# Patient Record
Sex: MALE | Race: BLACK OR AFRICAN AMERICAN | NOT HISPANIC OR LATINO
[De-identification: names, ages, dates, MRNs, and addresses within clinical notes are randomized per-mention and may not be internally consistent; named-entity substitution may affect disease eponyms.]

---

## 2019-05-28 ENCOUNTER — APPOINTMENT (OUTPATIENT)
Dept: ORTHOPEDIC SURGERY | Facility: CLINIC | Age: 59
End: 2019-05-28
Payer: MEDICARE

## 2019-05-28 VITALS
DIASTOLIC BLOOD PRESSURE: 90 MMHG | BODY MASS INDEX: 25.9 KG/M2 | SYSTOLIC BLOOD PRESSURE: 131 MMHG | HEIGHT: 67 IN | HEART RATE: 71 BPM | WEIGHT: 165 LBS

## 2019-05-28 DIAGNOSIS — Z78.9 OTHER SPECIFIED HEALTH STATUS: ICD-10-CM

## 2019-05-28 PROCEDURE — 99204 OFFICE O/P NEW MOD 45 MIN: CPT

## 2019-06-14 ENCOUNTER — OTHER (OUTPATIENT)
Age: 59
End: 2019-06-14

## 2019-06-17 ENCOUNTER — APPOINTMENT (OUTPATIENT)
Dept: ULTRASOUND IMAGING | Facility: CLINIC | Age: 59
End: 2019-06-17
Payer: MEDICARE

## 2019-06-17 ENCOUNTER — OUTPATIENT (OUTPATIENT)
Dept: OUTPATIENT SERVICES | Facility: HOSPITAL | Age: 59
LOS: 1 days | End: 2019-06-17

## 2019-06-17 DIAGNOSIS — R22.31 LOCALIZED SWELLING, MASS AND LUMP, RIGHT UPPER LIMB: ICD-10-CM

## 2019-06-17 PROCEDURE — 76882 US LMTD JT/FCL EVL NVASC XTR: CPT | Mod: 26,59,RT

## 2019-06-17 PROCEDURE — 93971 EXTREMITY STUDY: CPT | Mod: 26,RT

## 2019-06-20 ENCOUNTER — APPOINTMENT (OUTPATIENT)
Dept: ORTHOPEDIC SURGERY | Facility: CLINIC | Age: 59
End: 2019-06-20
Payer: MEDICARE

## 2019-06-20 VITALS
DIASTOLIC BLOOD PRESSURE: 79 MMHG | WEIGHT: 165 LBS | HEIGHT: 67 IN | BODY MASS INDEX: 25.9 KG/M2 | SYSTOLIC BLOOD PRESSURE: 149 MMHG | HEART RATE: 68 BPM

## 2019-06-20 PROCEDURE — 99214 OFFICE O/P EST MOD 30 MIN: CPT

## 2019-06-23 NOTE — HISTORY OF PRESENT ILLNESS
[FreeTextEntry1] : 06/20/2019\par Mr. FRANCIS HANCOCK returns for follow up of right hand and wrist swelling.  He had ultrasound and duplex done.  His symptoms have not improved.  He has minimal pain and does not have any paresthesia.\par \par 05/28/2019\par Mr. FRANCIS HANCOCK is a pleasant 58 year old male, RHD, disabled from injuries in bilateral knees from a fall.  He presents to the office for evaluation of right hand swelling.  Denies pain\par \par Duration of Symptoms: a couple of months\par Prior Injury: denies\par Pain Quality: denies pain\par Radiation: denies\par Paresthesia: denies\par Night Symptoms: denies\par Relieving Factor: nothing\par Exacerbating Factor: heat\par Previous Treatment: NSAIDs from PCP did not help\par Diabetic: denies\par Thyroid Disease: denies\par Tobacco Use: denies\par Prescription Pain Meds: oxycodone as needed from PCP\par Drugs: marijuana sometimes.

## 2019-06-23 NOTE — DISCUSSION/SUMMARY
[FreeTextEntry1] : 58M with right hand swelling and common extensor tenosynovitis\par \par -We discussed in detail the clinical and imaging findings\par -We discussed the possible causes of patient's conditions\par -I referred him to OT to work on ROM, pain control, edema control, modalities, strengthening, home exercises.  Tendinitis protocol.  WBAT.  He can wear a wrist brace at night and as needed for soft tissue rest.\par -I prescribed transdermal antiinflammatory cream BID as needed for symptom relief\par -I prescribed a short course of meloxicam 7.5mg daily for two weeks.  We discussed the GI and renal side effects of NSAIDs.\par -I referred him to rheumatology to work up possible inflammatory cause of his swelling\par -I will see him as needed\par -Patient had the opportunity to ask questions and he was in agreement with the plan\par -Over 50% of the time spent with the patient was on counseling the patient on the above diagnosis, treatment plan and prognosis.

## 2019-06-23 NOTE — REVIEW OF SYSTEMS
[Joint Stiffness] : joint stiffness [Joint Swelling] : joint swelling [Negative] : Allergic/Immunologic

## 2019-06-23 NOTE — PHYSICAL EXAM
[de-identified] : GENERAL: Awake, alert, cooperative, answers questions appropriately. No acute distress.  Ambulates independently with a normal gait.\par SKIN: Warm, dry, intact. Color and turgor normal. \par LUNGS: Demonstrates unlabored breathing on room air with no accessory muscle use.\par EXTREMITIES: Warm and well-perfused. \par NEUROLOGICAL: Grossly intact.\par \par FOCUSED UPPER EXTREMITY EXAM:\par \par RUE:\par -skin intact without any erythema or ecchymosis; generalized swelling in the hand, fingers, and dorsal wrist; normal finger cascade without malrotation\par -soft tissue mass over dorsal wrist about 2 x 3 cm, mobile, nontender, no Tinel's, no pulsatility\par -no thenar atrophy; no intrinsics wasting; 5/5 strength thumb opposition; 5/5 strength intrinsics\par -no tenderness to palpation over the A1 pulleys\par -no tenderness to palpation along first dorsal wrist compartment\par -no tenderness to palpation at the base of the thumb\par -no tenderness to palpation at the anatomic snuffbox\par -no pain with passive thumb circumduction with negative grind test\par -no pain with resisted thumb extension with negative Finkelstein test\par -no tenderness to palpation in the SL interval; negative scaphoid shift with gentle passive motion\par -no tenderness at the fovea\par -almost able to make full fist, stiffness in all digits from the swelling, no scissoring\par -decreased wrist ROM from swelling; full forearm supination/pronation; no ECU subluxation; DRUJ stable and nontender\par -negative carpal tunnel compression test, negative Phalen's, negative Tinel's at the wrist\par -no ulnar nerve subluxation at elbow with gentle PROM; negative cubital tunnel compression test; negative Tinel's at elbow\par -negative Froment's sign; negative Wartenberg's sign; no clawing\par -sensation intact in radial, ulnar, and median nerve distributions\par -Brisk capillary refill, fingers warm well perfused\par  [de-identified] : No new XRs were taken during today's visit.\par \par Duplex from 6/17/19 did not reveal any DVT in the RUE.\par \par U/S from 6/17/19 showed mild to moderate tenosynovitis of common extensor tendons without mass or ganglion.\par \par Outside X-rays of right hand (3 views) from 4/19/19 showed no significant degenerative changes with no acute fracture or dislocation.

## 2019-06-24 ENCOUNTER — APPOINTMENT (OUTPATIENT)
Dept: ULTRASOUND IMAGING | Facility: CLINIC | Age: 59
End: 2019-06-24

## 2019-06-27 ENCOUNTER — APPOINTMENT (OUTPATIENT)
Dept: RHEUMATOLOGY | Facility: CLINIC | Age: 59
End: 2019-06-27
Payer: MEDICARE

## 2019-06-27 VITALS
OXYGEN SATURATION: 98 % | BODY MASS INDEX: 25.83 KG/M2 | TEMPERATURE: 97.7 F | SYSTOLIC BLOOD PRESSURE: 140 MMHG | HEART RATE: 61 BPM | WEIGHT: 155 LBS | HEIGHT: 65 IN | DIASTOLIC BLOOD PRESSURE: 70 MMHG

## 2019-06-27 DIAGNOSIS — Z82.69 FAMILY HISTORY OF OTHER DISEASES OF THE MUSCULOSKELETAL SYSTEM AND CONNECTIVE TISSUE: ICD-10-CM

## 2019-06-27 DIAGNOSIS — Z83.2 FAMILY HISTORY OF DISEASES OF THE BLOOD AND BLOOD-FORMING ORGANS AND CERTAIN DISORDERS INVOLVING THE IMMUNE MECHANISM: ICD-10-CM

## 2019-06-27 PROCEDURE — 99204 OFFICE O/P NEW MOD 45 MIN: CPT

## 2019-06-27 NOTE — PHYSICAL EXAM
[General Appearance - Alert] : alert [General Appearance - Well Nourished] : well nourished [Sclera] : the sclera and conjunctiva were normal [General Appearance - In No Acute Distress] : in no acute distress [PERRL With Normal Accommodation] : pupils were equal in size, round, and reactive to light [Extraocular Movements] : extraocular movements were intact [Outer Ear] : the ears and nose were normal in appearance [Nasal Cavity] : the nasal mucosa and septum were normal [Oropharynx] : the oropharynx was normal [Neck Appearance] : the appearance of the neck was normal [Neck Cervical Mass (___cm)] : no neck mass was observed [Thyroid Diffuse Enlargement] : the thyroid was not enlarged [Auscultation Breath Sounds / Voice Sounds] : lungs were clear to auscultation bilaterally [Heart Rate And Rhythm] : heart rate was normal and rhythm regular [Heart Sounds] : normal S1 and S2 [Heart Sounds Gallop] : no gallops [Murmurs] : no murmurs [Heart Sounds Pericardial Friction Rub] : no pericardial rub [Full Pulse] : the pedal pulses are present [Edema] : there was no peripheral edema [Bowel Sounds] : normal bowel sounds [Abdomen Tenderness] : non-tender [Abdomen Soft] : soft [Cervical Lymph Nodes Enlarged Posterior Bilaterally] : posterior cervical [Cervical Lymph Nodes Enlarged Anterior Bilaterally] : anterior cervical [Abdomen Mass (___ Cm)] : no abdominal mass palpated [Supraclavicular Lymph Nodes Enlarged Bilaterally] : supraclavicular [No CVA Tenderness] : no ~M costovertebral angle tenderness [No Spinal Tenderness] : no spinal tenderness [Abnormal Walk] : normal gait [Nail Clubbing] : no clubbing  or cyanosis of the fingernails [Motor Tone] : muscle strength and tone were normal [] : no rash [Skin Color & Pigmentation] : normal skin color and pigmentation [Deep Tendon Reflexes (DTR)] : deep tendon reflexes were 2+ and symmetric [Sensation] : the sensory exam was normal to light touch and pinprick [Cranial Nerves] : cranial nerves 2-12 were intact [Oriented To Time, Place, And Person] : oriented to person, place, and time [Impaired Insight] : insight and judgment were intact [Affect] : the affect was normal [FreeTextEntry1] : CN 2-12 intact, no facial asymmetry, see MSK exam

## 2019-06-27 NOTE — CONSULT LETTER
[Dear  ___] : Dear  [unfilled], [Consult Closing:] : Thank you very much for allowing me to participate in the care of this patient.  If you have any questions, please do not hesitate to contact me. [Please see my note below.] : Please see my note below. [( Thank you for referring [unfilled] for consultation for _____ )] : Thank you for referring [unfilled] for consultation for [unfilled] [Sincerely,] : Sincerely, [FreeTextEntry2] : Joyce Barger [FreeTextEntry3] : Peg King MD\par Rheumatology\par [facility] \par \par [address] [place] [address]\par P: [phone]\par F: [phone]\par   [DrHalima  ___] : Dr. CUTLER

## 2019-06-27 NOTE — REVIEW OF SYSTEMS
[Feeling Tired] : feeling tired [Negative] : Endocrine [Arthralgias] : arthralgias [Joint Pain] : joint pain [Joint Swelling] : joint swelling [As Noted in HPI] : as noted in HPI [Joint Stiffness] : joint stiffness [Limb Weakness] : limb weakness

## 2019-06-27 NOTE — ASSESSMENT
[FreeTextEntry1] : FRANCIS HANCOCK is a 58 year old man who presents with 4 month hx of slowly resolving R hand/wrist tenosynovitis seen on U/S and synovitis over PIPs/DIPs and ganglion cyst at base of hand. Over same time course with R foot swelling, now resolved, new weakness in RLE, paresthesias in b/l LE and FH of SLE/sarcoid. Differential of an inflammatory arthritis + neurological symptoms remains broad - SLE, Sjogrens, APLS, sarcoid, infectious/ viral infection, thyroid d/o, RA, PsA, hemochromatosis, Lyme.\par \par - check full serologies to cover all differentials above, check syphilis and B12/folate as well, cpk/ aldolase\par - neurology referral \par - CXR to eval for sarcoid \par - has not started mobic yet, advised to take once daily with food x 2 weeks, then prn pain\par - RTC in 2-4 weeks

## 2019-06-27 NOTE — HISTORY OF PRESENT ILLNESS
[FreeTextEntry1] : FRANCIS HANCOCK is a 58 year old man who presents with abrupt onset of pain and swelling in R wrist, R PIPs and DIPs 4 months ago, has persisted with slow resolution since. No preceding infections, trauma, breaks in skin. Limited ability to make a fist 2/2 swelling, now improved by 75% without meds, no further wrist pain. + ganglion cyst at base on hand shorting after pain started, nodule non-tender and mobile and has nearly resolved. R shoulder achiness over same time course, R foot edema at time on onset, given Lasix by PMD and swelling resolved, no recurrence. Over same time course has noted weakness in his R thigh, sensation that he "doesn't have control over my right foot" and heat sensation in LLE. S/p CT brain recently to eval for ?CVA. \par \par Tried celebrex by PMD when joint symptoms began, no relief and hasn't tried OTC NSAIDs. Given topical NSAIDs and Mobic by ortho but has not started as yet. 30 min of AM stiffness in R hand. L hand/wrist/shoulder unaffected. \par \par No hx of gout. On ROS + mild night sweats and fatigue over same time course, denies F/C, unintentional weight loss, infections, CP, SOB, cough, salivary gland swelling, abd pain, diarrhea, dysuria. No hx of STDs or tick bites. No rash, no EN, no psoriasis, + chronic low back pain but no worsening spine issues, no eye inflammation, or IBD. + coloscopy, normal. + FH cousins with SLE, sarcoid \par \par Imaging: \par - Duplex  6/17/19 did not reveal any DVT in the RUE.\par - U/S 6/17/19 + mild to moderate tenosynovitis of common extensor tendons without mass or ganglion.\par - X-rays of right hand 4/19/19 - no significant degenerative changes with no acute fracture or dislocation.\par

## 2019-07-16 ENCOUNTER — FORM ENCOUNTER (OUTPATIENT)
Age: 59
End: 2019-07-16

## 2019-07-16 ENCOUNTER — APPOINTMENT (OUTPATIENT)
Dept: NEUROLOGY | Facility: CLINIC | Age: 59
End: 2019-07-16
Payer: MEDICARE

## 2019-07-16 VITALS
BODY MASS INDEX: 25.11 KG/M2 | HEIGHT: 67 IN | SYSTOLIC BLOOD PRESSURE: 130 MMHG | WEIGHT: 160 LBS | DIASTOLIC BLOOD PRESSURE: 70 MMHG

## 2019-07-16 PROCEDURE — 99204 OFFICE O/P NEW MOD 45 MIN: CPT

## 2019-07-16 RX ORDER — CREAM BASE NO.31
CREAM (GRAM) MISCELLANEOUS
Qty: 1 | Refills: 0 | Status: DISCONTINUED | COMMUNITY
Start: 2019-06-20 | End: 2019-07-16

## 2019-07-16 RX ORDER — MELOXICAM 7.5 MG/1
7.5 TABLET ORAL DAILY
Qty: 14 | Refills: 0 | Status: DISCONTINUED | COMMUNITY
Start: 2019-06-20 | End: 2019-07-16

## 2019-07-17 ENCOUNTER — OUTPATIENT (OUTPATIENT)
Dept: OUTPATIENT SERVICES | Facility: HOSPITAL | Age: 59
LOS: 1 days | End: 2019-07-17

## 2019-07-17 ENCOUNTER — APPOINTMENT (OUTPATIENT)
Dept: MRI IMAGING | Facility: CLINIC | Age: 59
End: 2019-07-17
Payer: MEDICARE

## 2019-07-17 DIAGNOSIS — G81.90 HEMIPLEGIA, UNSPECIFIED AFFECTING UNSPECIFIED SIDE: ICD-10-CM

## 2019-07-17 PROCEDURE — 70553 MRI BRAIN STEM W/O & W/DYE: CPT | Mod: 26

## 2019-07-19 ENCOUNTER — OTHER (OUTPATIENT)
Age: 59
End: 2019-07-19

## 2019-07-26 ENCOUNTER — LABORATORY RESULT (OUTPATIENT)
Age: 59
End: 2019-07-26

## 2019-07-29 LAB
ALBUMIN SERPL ELPH-MCNC: 4.3 G/DL
ALP BLD-CCNC: 57 U/L
ALT SERPL-CCNC: 14 U/L
ANA SER IF-ACNC: NEGATIVE
ANION GAP SERPL CALC-SCNC: 12 MMOL/L
APPEARANCE: CLEAR
AST SERPL-CCNC: 18 U/L
BACTERIA: NEGATIVE
BASOPHILS # BLD AUTO: 0.11 K/UL
BASOPHILS NFR BLD AUTO: 1.8 %
BILIRUB SERPL-MCNC: 0.4 MG/DL
BILIRUBIN URINE: NEGATIVE
BLOOD URINE: NEGATIVE
BUN SERPL-MCNC: 20 MG/DL
C3 SERPL-MCNC: 128 MG/DL
C4 SERPL-MCNC: 37 MG/DL
CALCIUM SERPL-MCNC: 9.7 MG/DL
CCP AB SER IA-ACNC: <8 UNITS
CHLORIDE SERPL-SCNC: 102 MMOL/L
CK SERPL-CCNC: 97 U/L
CO2 SERPL-SCNC: 28 MMOL/L
COLOR: NORMAL
CREAT SERPL-MCNC: 1.41 MG/DL
CRP SERPL-MCNC: <0.1 MG/DL
DSDNA AB SER-ACNC: <12 IU/ML
ENA RNP AB SER IA-ACNC: <0.2 AL
ENA SM AB SER IA-ACNC: <0.2 AL
ENA SS-A AB SER IA-ACNC: <0.2 AL
ENA SS-B AB SER IA-ACNC: <0.2 AL
EOSINOPHIL # BLD AUTO: 0.29 K/UL
EOSINOPHIL NFR BLD AUTO: 4.7 %
ERYTHROCYTE [SEDIMENTATION RATE] IN BLOOD BY WESTERGREN METHOD: 17 MM/HR
FERRITIN SERPL-MCNC: 475 NG/ML
FOLATE SERPL-MCNC: 6.9 NG/ML
GLUCOSE QUALITATIVE U: NEGATIVE
GLUCOSE SERPL-MCNC: 102 MG/DL
HCT VFR BLD CALC: 40.9 %
HGB BLD-MCNC: 12.9 G/DL
HYALINE CASTS: 1 /LPF
IMM GRANULOCYTES NFR BLD AUTO: 0.5 %
IRON SATN MFR SERPL: 25 %
IRON SERPL-MCNC: 69 UG/DL
KETONES URINE: NEGATIVE
LEUKOCYTE ESTERASE URINE: NEGATIVE
LYMPHOCYTES # BLD AUTO: 1.51 K/UL
LYMPHOCYTES NFR BLD AUTO: 24.6 %
MAN DIFF?: NORMAL
MCHC RBC-ENTMCNC: 31.5 GM/DL
MCHC RBC-ENTMCNC: 31.7 PG
MCV RBC AUTO: 100.5 FL
MICROSCOPIC-UA: NORMAL
MONOCYTES # BLD AUTO: 0.43 K/UL
MONOCYTES NFR BLD AUTO: 7 %
MPO AB + PR3 PNL SER: NORMAL
NEUTROPHILS # BLD AUTO: 3.77 K/UL
NEUTROPHILS NFR BLD AUTO: 61.4 %
NITRITE URINE: NEGATIVE
PH URINE: 6
PLATELET # BLD AUTO: 239 K/UL
POTASSIUM SERPL-SCNC: 4.5 MMOL/L
PROT SERPL-MCNC: 6.6 G/DL
PROTEIN URINE: NEGATIVE
RBC # BLD: 4.07 M/UL
RBC # FLD: 11 %
RED BLOOD CELLS URINE: 2 /HPF
RF+CCP IGG SER-IMP: NEGATIVE
RHEUMATOID FACT SER QL: 16 IU/ML
SODIUM SERPL-SCNC: 142 MMOL/L
SPECIFIC GRAVITY URINE: 1.02
SQUAMOUS EPITHELIAL CELLS: 1 /HPF
THYROGLOB AB SERPL-ACNC: <20 IU/ML
THYROPEROXIDASE AB SERPL IA-ACNC: <10 IU/ML
TIBC SERPL-MCNC: 275 UG/DL
TSH SERPL-ACNC: 0.53 UIU/ML
UIBC SERPL-MCNC: 206 UG/DL
URATE SERPL-MCNC: 8.1 MG/DL
UROBILINOGEN URINE: NORMAL
VIT B12 SERPL-MCNC: 371 PG/ML
WBC # FLD AUTO: 6.14 K/UL
WHITE BLOOD CELLS URINE: 1 /HPF

## 2019-07-30 ENCOUNTER — OTHER (OUTPATIENT)
Age: 59
End: 2019-07-30

## 2019-07-30 LAB
ACE BLD-CCNC: 39 U/L
ALDOLASE SERPL-CCNC: 6.2 U/L
B2 GLYCOPROT1 IGA SERPL IA-ACNC: <5 SAU
B2 GLYCOPROT1 IGG SER-ACNC: <5 SGU
B2 GLYCOPROT1 IGM SER-ACNC: <5 SMU
CARDIOLIPIN IGM SER-MCNC: <5 MPL
CONFIRM: 25.4 SEC
DEPRECATED CARDIOLIPIN IGA SER: <5 APL
DRVVT IMM 1:2 NP PPP: NORMAL
DRVVT SCREEN TO CONFIRM RATIO: 0.99 RATIO
SCREEN DRVVT: 30.3 SEC
T PALLIDUM AB SER QL IA: NEGATIVE

## 2019-08-02 LAB
B19V IGG SER QL IA: 2.6 INDEX
B19V IGG+IGM SER-IMP: NORMAL
B19V IGG+IGM SER-IMP: POSITIVE
B19V IGM FLD-ACNC: 0.2 INDEX
B19V IGM SER-ACNC: NEGATIVE
CARDIOLIPIN IGM SER-MCNC: <5 GPL
HLA-B27 RELATED AG QL: NORMAL

## 2019-08-05 LAB
B BURGDOR AB SER-IMP: NEGATIVE
B BURGDOR IGM PATRN SER IB-IMP: NEGATIVE
B BURGDOR18KD IGG SER QL IB: NORMAL
B BURGDOR23KD IGG SER QL IB: NORMAL
B BURGDOR23KD IGM SER QL IB: NORMAL
B BURGDOR28KD IGG SER QL IB: NORMAL
B BURGDOR30KD IGG SER QL IB: NORMAL
B BURGDOR31KD IGG SER QL IB: NORMAL
B BURGDOR39KD IGG SER QL IB: NORMAL
B BURGDOR39KD IGM SER QL IB: NORMAL
B BURGDOR41KD IGG SER QL IB: PRESENT
B BURGDOR41KD IGM SER QL IB: PRESENT
B BURGDOR45KD IGG SER QL IB: NORMAL
B BURGDOR58KD IGG SER QL IB: NORMAL
B BURGDOR66KD IGG SER QL IB: NORMAL
B BURGDOR93KD IGG SER QL IB: NORMAL

## 2019-08-07 ENCOUNTER — APPOINTMENT (OUTPATIENT)
Dept: NEUROLOGY | Facility: CLINIC | Age: 59
End: 2019-08-07
Payer: MEDICARE

## 2019-08-07 PROCEDURE — 93880 EXTRACRANIAL BILAT STUDY: CPT

## 2019-08-07 PROCEDURE — 93890: CPT

## 2019-08-07 PROCEDURE — 93886 INTRACRANIAL COMPLETE STUDY: CPT

## 2019-08-08 NOTE — PROCEDURE
[FreeTextEntry3] : Carotid duplex  Dr. Moreau\par \par Date of study: 8/7/19\par \par Real-time color duplex ultrasound examination of the carotid system was performed including spectral waveform analysis and color-flow evaluation.\par \par Some plaque was seen near the bulbs bilaterally. Doppler flow characteristics showed no significant peak systolic or end-diastolic velocity elevations in either internal carotid artery to suggest any hemodynamically significant stenosis.\par \par Impression: Some plaque near the bulbs bilaterally. No ultrasound evidence for any hemodynamically significant stenosis in either internal carotid artery.\par \par Transcranial Doppler normal as well.\par

## 2019-08-13 ENCOUNTER — APPOINTMENT (OUTPATIENT)
Dept: RHEUMATOLOGY | Facility: CLINIC | Age: 59
End: 2019-08-13
Payer: MEDICARE

## 2019-08-13 VITALS
TEMPERATURE: 98.1 F | SYSTOLIC BLOOD PRESSURE: 126 MMHG | OXYGEN SATURATION: 100 % | BODY MASS INDEX: 24.96 KG/M2 | HEIGHT: 67 IN | HEART RATE: 60 BPM | WEIGHT: 159 LBS | DIASTOLIC BLOOD PRESSURE: 82 MMHG

## 2019-08-13 DIAGNOSIS — M77.9 ENTHESOPATHY, UNSPECIFIED: ICD-10-CM

## 2019-08-13 PROCEDURE — 99214 OFFICE O/P EST MOD 30 MIN: CPT

## 2019-08-13 NOTE — REVIEW OF SYSTEMS
[Feeling Tired] : feeling tired [Arthralgias] : arthralgias [Joint Pain] : joint pain [Joint Swelling] : joint swelling [Joint Stiffness] : joint stiffness [As Noted in HPI] : as noted in HPI [Limb Weakness] : limb weakness [Negative] : Heme/Lymph

## 2019-08-21 PROBLEM — M77.9 TENDINITIS OF EXTENSOR TENDON OF RIGHT HAND: Status: ACTIVE | Noted: 2019-06-20

## 2019-08-21 NOTE — PHYSICAL EXAM
[General Appearance - Alert] : alert [General Appearance - Well Nourished] : well nourished [General Appearance - In No Acute Distress] : in no acute distress [PERRL With Normal Accommodation] : pupils were equal in size, round, and reactive to light [Sclera] : the sclera and conjunctiva were normal [Extraocular Movements] : extraocular movements were intact [Outer Ear] : the ears and nose were normal in appearance [Nasal Cavity] : the nasal mucosa and septum were normal [Oropharynx] : the oropharynx was normal [Neck Appearance] : the appearance of the neck was normal [Auscultation Breath Sounds / Voice Sounds] : lungs were clear to auscultation bilaterally [Heart Sounds] : normal S1 and S2 [Heart Rate And Rhythm] : heart rate was normal and rhythm regular [Heart Sounds Gallop] : no gallops [Murmurs] : no murmurs [Heart Sounds Pericardial Friction Rub] : no pericardial rub [Full Pulse] : the pedal pulses are present [Bowel Sounds] : normal bowel sounds [Edema] : there was no peripheral edema [Abdomen Soft] : soft [Abdomen Tenderness] : non-tender [Cervical Lymph Nodes Enlarged Posterior Bilaterally] : posterior cervical [Abdomen Mass (___ Cm)] : no abdominal mass palpated [Cervical Lymph Nodes Enlarged Anterior Bilaterally] : anterior cervical [Supraclavicular Lymph Nodes Enlarged Bilaterally] : supraclavicular [No CVA Tenderness] : no ~M costovertebral angle tenderness [No Spinal Tenderness] : no spinal tenderness [Nail Clubbing] : no clubbing  or cyanosis of the fingernails [Abnormal Walk] : normal gait [Motor Tone] : muscle strength and tone were normal [Skin Color & Pigmentation] : normal skin color and pigmentation [Cranial Nerves] : cranial nerves 2-12 were intact [] : no rash [Deep Tendon Reflexes (DTR)] : deep tendon reflexes were 2+ and symmetric [Sensation] : the sensory exam was normal to light touch and pinprick [Oriented To Time, Place, And Person] : oriented to person, place, and time [Impaired Insight] : insight and judgment were intact [Affect] : the affect was normal [FreeTextEntry1] : CN 2-12 intact, no facial asymmetry, see MSK exam

## 2019-08-21 NOTE — ASSESSMENT
[FreeTextEntry1] : FRANCIS HANCOCK is a 58 year old man being seen for 4 month hx of persisting R hand/wrist tenosynovitis seen on U/S and synovitis over PIPs/DIPs and ganglion cyst at base of hand. Over same time course with R foot swelling, now resolved, new weakness in RLE, paresthesias in b/l LE and FH of SLE/sarcoid. Workup to date with low titer + RF and elevated ferritin, otherwise negative for SLE, thyroid, CPK, ANCA, sarcoid, infectious etiology. Sarcoid remains in differential even without characteristic findings. \par \par - start prednisone for persistent R hand synovitis in setting of + RF and no response to NSAIDs. If neuropathy/weakness AI related, may also show some improvement. Risks and benefits of steroids were d/w patient including but not limited to weight gain, diabetes, HTN, avascular necrosis, osteoporosis, glaucoma, cataract, infections and immunosuppression.\par  - obtain MRI R hand to eval for ongoing synovitis since last imaging as this may help elucidate underlying etiology\par - neurology f/u for LP given MRI findings -- discussed that this is an important test to have done, pt has not scheduled yet as he was concerned for the procedure/SE. \par - RTC following LP and repeat neuro eval

## 2019-08-21 NOTE — HISTORY OF PRESENT ILLNESS
[FreeTextEntry1] : FRANCIS HANCOCK is a 58 year old man who presents with abrupt onset of pain and swelling in R wrist, R PIPs and DIPs 4 months ago, has persisted with slow resolution since. No preceding infections, trauma, breaks in skin. Limited ability to make a fist 2/2 swelling, now improved by 75% without meds, no further wrist pain. + ganglion cyst at base on hand shorting after pain started, nodule non-tender and mobile and has nearly resolved. R shoulder achiness over same time course, R foot edema at time on onset, given Lasix by PMD and swelling resolved, no recurrence. Over same time course has noted weakness in his R thigh, sensation that he "doesn't have control over my right foot" and heat sensation in LLE. S/p CT brain recently to eval for ?CVA. \par \par Tried celebrex by PMD when joint symptoms began, no relief and hasn't tried OTC NSAIDs. Given topical NSAIDs and Mobic by ortho but has not started as yet. 30 min of AM stiffness in R hand. L hand/wrist/shoulder unaffected. \par \par No hx of gout. On ROS + mild night sweats and fatigue over same time course, denies F/C, unintentional weight loss, infections, CP, SOB, cough, salivary gland swelling, abd pain, diarrhea, dysuria. No hx of STDs or tick bites. No rash, no EN, no psoriasis, + chronic low back pain but no worsening spine issues, no eye inflammation, or IBD. + coloscopy, normal. + FH cousins with SLE, sarcoid \par \par Imaging: \par - Duplex  6/17/19 did not reveal any DVT in the RUE.\par - U/S 6/17/19 + mild to moderate tenosynovitis of common extensor tendons without mass or ganglion.\par - X-rays of right hand 4/19/19 - no significant degenerative changes with no acute fracture or dislocation.\par \par Still with pain and stiffness and limited ROM in R hand, R hand paresthesias as well. No recurrence of RLE edema but continued paresthesias, possibly a little worse. No other joint synovitis, AM stiffness. No rash. Tried mobic for a few days, stopped as no improvement. \par

## 2019-08-30 ENCOUNTER — CLINICAL ADVICE (OUTPATIENT)
Age: 59
End: 2019-08-30

## 2019-09-16 NOTE — ASU PATIENT PROFILE, ADULT - LEARNING ASSESSMENT (PATIENT) ADDITIONAL COMMENTS
Telephone interview with pt, instructed pt on pre-op instructions/teaching, tips for safer surgery, pain management scale, understanding of all instructions verbalized.

## 2019-09-18 ENCOUNTER — FORM ENCOUNTER (OUTPATIENT)
Age: 59
End: 2019-09-18

## 2019-09-18 RX ORDER — SODIUM CHLORIDE 9 MG/ML
3 INJECTION INTRAMUSCULAR; INTRAVENOUS; SUBCUTANEOUS ONCE
Refills: 0 | Status: DISCONTINUED | OUTPATIENT
Start: 2019-09-19 | End: 2019-10-04

## 2019-09-19 ENCOUNTER — OUTPATIENT (OUTPATIENT)
Dept: INPATIENT UNIT | Facility: HOSPITAL | Age: 59
LOS: 1 days | End: 2019-09-19
Payer: MEDICARE

## 2019-09-19 VITALS
DIASTOLIC BLOOD PRESSURE: 89 MMHG | OXYGEN SATURATION: 100 % | RESPIRATION RATE: 19 BRPM | HEART RATE: 68 BPM | SYSTOLIC BLOOD PRESSURE: 153 MMHG

## 2019-09-19 VITALS
WEIGHT: 164.91 LBS | SYSTOLIC BLOOD PRESSURE: 130 MMHG | TEMPERATURE: 98 F | RESPIRATION RATE: 16 BRPM | OXYGEN SATURATION: 99 % | HEIGHT: 67 IN | HEART RATE: 70 BPM | DIASTOLIC BLOOD PRESSURE: 75 MMHG

## 2019-09-19 DIAGNOSIS — G81.90 HEMIPLEGIA, UNSPECIFIED AFFECTING UNSPECIFIED SIDE: ICD-10-CM

## 2019-09-19 LAB
APPEARANCE CSF: CLEAR — SIGNIFICANT CHANGE UP
BASOPHILS # BLD AUTO: 0.11 K/UL — SIGNIFICANT CHANGE UP (ref 0–0.2)
BASOPHILS NFR BLD AUTO: 1.1 % — SIGNIFICANT CHANGE UP (ref 0–2)
COLOR CSF: COLORLESS — SIGNIFICANT CHANGE UP
CRYPTOC AG CSF-ACNC: NEGATIVE — SIGNIFICANT CHANGE UP
CSF PCR RESULT: SIGNIFICANT CHANGE UP
EOSINOPHIL # BLD AUTO: 0.53 K/UL — HIGH (ref 0–0.5)
EOSINOPHIL NFR BLD AUTO: 5.1 % — SIGNIFICANT CHANGE UP (ref 0–6)
GLUCOSE CSF-MCNC: 57 MG/DLG/24H — SIGNIFICANT CHANGE UP (ref 40–70)
GRAM STN FLD: SIGNIFICANT CHANGE UP
HCT VFR BLD CALC: 42.2 % — SIGNIFICANT CHANGE UP (ref 39–50)
HGB BLD-MCNC: 13.2 G/DL — SIGNIFICANT CHANGE UP (ref 13–17)
IMM GRANULOCYTES NFR BLD AUTO: 0.6 % — SIGNIFICANT CHANGE UP (ref 0–1.5)
LYMPHOCYTES # BLD AUTO: 2.68 K/UL — SIGNIFICANT CHANGE UP (ref 1–3.3)
LYMPHOCYTES # BLD AUTO: 25.9 % — SIGNIFICANT CHANGE UP (ref 13–44)
MCHC RBC-ENTMCNC: 31.3 GM/DL — LOW (ref 32–36)
MCHC RBC-ENTMCNC: 31.7 PG — SIGNIFICANT CHANGE UP (ref 27–34)
MCV RBC AUTO: 101.2 FL — HIGH (ref 80–100)
MONOCYTES # BLD AUTO: 0.9 K/UL — SIGNIFICANT CHANGE UP (ref 0–0.9)
MONOCYTES NFR BLD AUTO: 8.7 % — SIGNIFICANT CHANGE UP (ref 2–14)
NEUTROPHILS # BLD AUTO: 6.08 K/UL — SIGNIFICANT CHANGE UP (ref 1.8–7.4)
NEUTROPHILS # CSF: 0 % — SIGNIFICANT CHANGE UP
NEUTROPHILS NFR BLD AUTO: 58.6 % — SIGNIFICANT CHANGE UP (ref 43–77)
NRBC NFR CSF: 2 /UL — SIGNIFICANT CHANGE UP (ref 0–5)
PLATELET # BLD AUTO: 254 K/UL — SIGNIFICANT CHANGE UP (ref 150–400)
PROT CSF-MCNC: 45 MG/DL — SIGNIFICANT CHANGE UP (ref 15–45)
RBC # BLD: 4.17 M/UL — LOW (ref 4.2–5.8)
RBC # CSF: 1 /CMM — SIGNIFICANT CHANGE UP (ref 0–1)
RBC # FLD: 11.8 % — SIGNIFICANT CHANGE UP (ref 10.3–14.5)
SPECIMEN SOURCE: SIGNIFICANT CHANGE UP
TUBE TYPE: SIGNIFICANT CHANGE UP
WBC # BLD: 10.36 K/UL — SIGNIFICANT CHANGE UP (ref 3.8–10.5)
WBC # FLD AUTO: 10.36 K/UL — SIGNIFICANT CHANGE UP (ref 3.8–10.5)

## 2019-09-19 PROCEDURE — 87476 LYME DIS DNA AMP PROBE: CPT

## 2019-09-19 PROCEDURE — 87205 SMEAR GRAM STAIN: CPT

## 2019-09-19 PROCEDURE — 86255 FLUORESCENT ANTIBODY SCREEN: CPT

## 2019-09-19 PROCEDURE — 62272 THER SPI PNXR DRG CSF: CPT

## 2019-09-19 PROCEDURE — 77003 FLUOROGUIDE FOR SPINE INJECT: CPT

## 2019-09-19 PROCEDURE — 83916 OLIGOCLONAL BANDS: CPT

## 2019-09-19 PROCEDURE — 84157 ASSAY OF PROTEIN OTHER: CPT

## 2019-09-19 PROCEDURE — 87070 CULTURE OTHR SPECIMN AEROBIC: CPT

## 2019-09-19 PROCEDURE — 83873 ASSAY OF CSF PROTEIN: CPT

## 2019-09-19 PROCEDURE — 82784 ASSAY IGA/IGD/IGG/IGM EACH: CPT

## 2019-09-19 PROCEDURE — 86403 PARTICLE AGGLUT ANTBDY SCRN: CPT

## 2019-09-19 PROCEDURE — 36415 COLL VENOUS BLD VENIPUNCTURE: CPT

## 2019-09-19 PROCEDURE — 77003 FLUOROGUIDE FOR SPINE INJECT: CPT | Mod: 26

## 2019-09-19 PROCEDURE — 85027 COMPLETE CBC AUTOMATED: CPT

## 2019-09-19 PROCEDURE — 62270 DX LMBR SPI PNXR: CPT

## 2019-09-19 PROCEDURE — 87483 CNS DNA AMP PROBE TYPE 12-25: CPT

## 2019-09-19 PROCEDURE — 83615 LACTATE (LD) (LDH) ENZYME: CPT

## 2019-09-19 PROCEDURE — 82945 GLUCOSE OTHER FLUID: CPT

## 2019-09-19 PROCEDURE — 86592 SYPHILIS TEST NON-TREP QUAL: CPT

## 2019-09-19 PROCEDURE — 89051 BODY FLUID CELL COUNT: CPT

## 2019-09-19 RX ORDER — TAMSULOSIN HYDROCHLORIDE 0.4 MG/1
1 CAPSULE ORAL
Qty: 0 | Refills: 0 | DISCHARGE

## 2019-09-19 RX ORDER — OXYCODONE AND ACETAMINOPHEN 5; 325 MG/1; MG/1
1 TABLET ORAL ONCE
Refills: 0 | Status: DISCONTINUED | OUTPATIENT
Start: 2019-09-19 | End: 2019-09-19

## 2019-09-19 NOTE — ASU DISCHARGE PLAN (ADULT/PEDIATRIC) - CALL YOUR DOCTOR IF YOU HAVE ANY OF THE FOLLOWING:
Bleeding that does not stop/Pain not relieved by Medications/Nausea and vomiting that does not stop/Swelling that gets worse/Fever greater than (need to indicate Fahrenheit or Celsius)/worsening headache

## 2019-09-19 NOTE — BRIEF OPERATIVE NOTE - NSICDXBRIEFPROCEDURE_GEN_ALL_CORE_FT
PROCEDURES:  Lumbar puncture, with fluoroscopic guidance, for CSF drainage 19-Sep-2019 10:58:16  Piper Steel

## 2019-09-20 LAB
IGG CSF-MCNC: 4.8 MG/DL — HIGH
LDH SERPL L TO P-CCNC: 23 U/L — SIGNIFICANT CHANGE UP

## 2019-09-23 LAB
CULTURE RESULTS: SIGNIFICANT CHANGE UP
SPECIMEN SOURCE: SIGNIFICANT CHANGE UP

## 2019-09-24 ENCOUNTER — APPOINTMENT (OUTPATIENT)
Dept: RHEUMATOLOGY | Facility: CLINIC | Age: 59
End: 2019-09-24

## 2019-09-25 LAB
AQP4 H2O CHANNEL AB SERPL IA-ACNC: NEGATIVE — SIGNIFICANT CHANGE UP
B BURGDOR DNA SPEC QL NAA+PROBE: NEGATIVE — SIGNIFICANT CHANGE UP
MBP CSF-MCNC: <2 MCG/L — LOW (ref 2–4)
OLIGOCLONAL BANDS CSF ELPH-IMP: SIGNIFICANT CHANGE UP
VDRL CSF-TITR: NEGATIVE — SIGNIFICANT CHANGE UP

## 2019-10-02 ENCOUNTER — APPOINTMENT (OUTPATIENT)
Dept: NEUROLOGY | Facility: CLINIC | Age: 59
End: 2019-10-02
Payer: MEDICARE

## 2019-10-02 VITALS
DIASTOLIC BLOOD PRESSURE: 80 MMHG | SYSTOLIC BLOOD PRESSURE: 130 MMHG | HEIGHT: 67 IN | BODY MASS INDEX: 24.96 KG/M2 | WEIGHT: 159 LBS

## 2019-10-02 PROCEDURE — 99214 OFFICE O/P EST MOD 30 MIN: CPT

## 2019-10-10 ENCOUNTER — APPOINTMENT (OUTPATIENT)
Dept: NEUROLOGY | Facility: CLINIC | Age: 59
End: 2019-10-10
Payer: MEDICARE

## 2019-10-10 PROCEDURE — 95910 NRV CNDJ TEST 7-8 STUDIES: CPT

## 2019-10-10 PROCEDURE — 95937 NEUROMUSCULAR JUNCTION TEST: CPT

## 2019-10-10 PROCEDURE — 95886 MUSC TEST DONE W/N TEST COMP: CPT

## 2019-10-15 ENCOUNTER — APPOINTMENT (OUTPATIENT)
Dept: RHEUMATOLOGY | Facility: CLINIC | Age: 59
End: 2019-10-15
Payer: MEDICARE

## 2019-10-15 DIAGNOSIS — M25.831 OTHER SPECIFIED JOINT DISORDERS, RIGHT WRIST: ICD-10-CM

## 2019-10-15 PROCEDURE — 99214 OFFICE O/P EST MOD 30 MIN: CPT

## 2019-10-15 RX ORDER — PREDNISONE 5 MG/1
5 TABLET ORAL
Qty: 90 | Refills: 0 | Status: DISCONTINUED | COMMUNITY
Start: 2019-08-13 | End: 2019-10-15

## 2019-10-15 NOTE — REVIEW OF SYSTEMS
[Feeling Tired] : feeling tired [Joint Pain] : joint pain [Arthralgias] : arthralgias [Joint Stiffness] : joint stiffness [Joint Swelling] : joint swelling [As Noted in HPI] : as noted in HPI [Limb Weakness] : limb weakness [Negative] : Heme/Lymph

## 2019-10-18 VITALS — DIASTOLIC BLOOD PRESSURE: 78 MMHG | SYSTOLIC BLOOD PRESSURE: 124 MMHG | HEART RATE: 68 BPM | RESPIRATION RATE: 14 BRPM

## 2019-10-18 NOTE — PHYSICAL EXAM
[General Appearance - Alert] : alert [General Appearance - In No Acute Distress] : in no acute distress [General Appearance - Well Nourished] : well nourished [Sclera] : the sclera and conjunctiva were normal [Extraocular Movements] : extraocular movements were intact [PERRL With Normal Accommodation] : pupils were equal in size, round, and reactive to light [Nasal Cavity] : the nasal mucosa and septum were normal [Outer Ear] : the ears and nose were normal in appearance [Oropharynx] : the oropharynx was normal [Neck Appearance] : the appearance of the neck was normal [Auscultation Breath Sounds / Voice Sounds] : lungs were clear to auscultation bilaterally [Heart Rate And Rhythm] : heart rate was normal and rhythm regular [Heart Sounds] : normal S1 and S2 [Murmurs] : no murmurs [Heart Sounds Gallop] : no gallops [Heart Sounds Pericardial Friction Rub] : no pericardial rub [Full Pulse] : the pedal pulses are present [Edema] : there was no peripheral edema [Bowel Sounds] : normal bowel sounds [Abdomen Soft] : soft [Abdomen Tenderness] : non-tender [Cervical Lymph Nodes Enlarged Posterior Bilaterally] : posterior cervical [Abdomen Mass (___ Cm)] : no abdominal mass palpated [Supraclavicular Lymph Nodes Enlarged Bilaterally] : supraclavicular [Cervical Lymph Nodes Enlarged Anterior Bilaterally] : anterior cervical [No CVA Tenderness] : no ~M costovertebral angle tenderness [No Spinal Tenderness] : no spinal tenderness [Nail Clubbing] : no clubbing  or cyanosis of the fingernails [Motor Tone] : muscle strength and tone were normal [Skin Color & Pigmentation] : normal skin color and pigmentation [] : no rash [Cranial Nerves] : cranial nerves 2-12 were intact [Oriented To Time, Place, And Person] : oriented to person, place, and time [Impaired Insight] : insight and judgment were intact [Affect] : the affect was normal [FreeTextEntry1] : CN 2-12 intact, no facial asymmetry, see MSK exam

## 2019-10-18 NOTE — ASSESSMENT
[FreeTextEntry1] : FRANCIS HANCOCK is a 58 year old man being seen for multiple issues including self-limited synovitis of R hand without response to anti-inflammatories, persistent RLE proximal weakness, and persistent LLE paresthesias now with extension to L torso. Workup to date with low titer + RF and elevated ferritin, otherwise negative for SLE, thyroid, CPK, ANCA, sarcoid, infectious etiology. Sarcoid remains in differential but should have had partial response to steroids. ?IBM but this explains the weakness but not the synovitis or paresthesias, ?ALS but again explains the motor symptoms but not the joint inflammation or sensory issues. Difficult to determine unifying dx at present. \par \par - will d/w neurologist to determine next steps -- I am reluctant to get invasive testing such as a muscle bx when it may only explain one aspect of his symptoms. I discussed this at length with the patient and he is in agreement. \par  - ?MRI R thigh as next possible step and/or further EMG testing by neuro\par - will call to d/w patient once I have discussed with neurology

## 2019-10-18 NOTE — HISTORY OF PRESENT ILLNESS
[FreeTextEntry1] : Since last visit reports no further joint swelling, but continues to have slight puffiness of R hand and says it feels "tight." Neurologic complaints remain -- R thigh weakness with limitation in ADLs, worsening LLE paresthesias and sensitivity to heat which is exacerbated by neck movements. Also reports similar paresthesias on L side of torso only with slow progression to L arm. No UE weakness, no weakness in LLE. No rashes, no night sweats, F/C, worsening fatigue, unintentional weight loss. SLE ROS remains negative. Neuro w/u for MS negative, MRI spine with some C/L DJD, EMG reportedly normal. \par \par Off steroids x 1 week, hasn't helped at all. Doesn't feel different off them. Not placed on any meds by neuro, saw neurosurgeon --no intervention.

## 2019-10-30 ENCOUNTER — CLINICAL ADVICE (OUTPATIENT)
Age: 59
End: 2019-10-30

## 2019-12-20 ENCOUNTER — APPOINTMENT (OUTPATIENT)
Dept: RHEUMATOLOGY | Facility: CLINIC | Age: 59
End: 2019-12-20
Payer: MEDICARE

## 2019-12-20 VITALS — HEART RATE: 78 BPM | SYSTOLIC BLOOD PRESSURE: 120 MMHG | DIASTOLIC BLOOD PRESSURE: 74 MMHG | RESPIRATION RATE: 14 BRPM

## 2019-12-20 PROCEDURE — 20610 DRAIN/INJ JOINT/BURSA W/O US: CPT | Mod: RT

## 2019-12-20 PROCEDURE — 99214 OFFICE O/P EST MOD 30 MIN: CPT | Mod: 25

## 2019-12-23 RX ORDER — METHYLPRED ACET/NACL,ISO-OS/PF 40 MG/ML
40 VIAL (ML) INJECTION
Qty: 1 | Refills: 0 | Status: COMPLETED | OUTPATIENT
Start: 2019-12-23

## 2019-12-23 RX ADMIN — METHYLPREDNISOLONE ACETATE MG/ML: 40 INJECTION, SUSPENSION INTRA-ARTICULAR; INTRALESIONAL; INTRAMUSCULAR; SOFT TISSUE at 00:00

## 2019-12-23 NOTE — PROCEDURE
[Other Date:___] : Date: [unfilled] [FreeTextEntry1] : Procedure: R shoulder IA injection \par \par Verbal consent obtained from FRANCIS HANCOCK after discussion of risks/ benefits / alternatives which include but are not limited to pain at injection site, infection, bleeding, skin hypopigmentation. \par \par Site identified, marked and sterilized with Betadine. Ethyl chloride applied for topical anesthetic.\par \par 22 g needle inserted via posterior approach. No fluid able to be aspirated. \par \par 40 mg of depomedrol and 2 ml of lidocaine injected. Mr. HANCOCK tolerated procedure well and there was minimal blood loss.\par \par Post-procedure instructions provided to Mr. HANCOCK including to avoid strenuous exercise for the next 48 hours and apply cold compresses to joint q6 hours for next 24 hours. Advised to notify the office and be evaluated at ED/ UC if worsening pain, swelling, warmth, or bleeding from site or if he develops a fever, chills. Pt verbalized understanding. \par \par Depomedrol Lot [id number], Exp 12/2020

## 2019-12-23 NOTE — ASSESSMENT
[FreeTextEntry1] : FRANCIS HANCOCK is a 58 year old man being seen for multiple issues including self-limited synovitis of R hand without response to anti-inflammatories, persistent RLE proximal weakness, and persistent LLE paresthesias now with extension to L torso. Workup to date with low titer + RF and elevated ferritin, otherwise negative for SLE, thyroid, CPK, ANCA, sarcoid, infectious etiology. Sarcoid remains in differential but should have had partial response to steroids. ?IBM but this explains the weakness but not the synovitis or paresthesias, ?ALS but again explains the motor symptoms but not the joint inflammation or sensory issues. Difficult to determine unifying dx at present. New C spine lesion on recent repeat MRI, MS remains highest on the DDx, due to see MS specialist. OA in R shoulder with limitation to ADLs and already with overall difficulty in ADLs 2/2 neuro symptoms -- s/p injection today, tolerated well.\par \par - f/u with neurology and orthopedics for possible MS vs possible bx -- advised to check for sarcoid if he does undergo bx -- call office for copy of note to be sent if other physicians would like to see it -- doesn't remember their names today\par - RTC in 2 months to assess R shoulder and other symptoms

## 2019-12-23 NOTE — HISTORY OF PRESENT ILLNESS
[FreeTextEntry1] : FRANCIS HANCOCK is a 58 year old man initially seen for abrupt onset of pain and swelling in R wrist, R PIPs and DIPs which self improved, but still has some mild soft tissue fullness compared to other hand, was not responsive to steroids. R shoulder achiness and R foot edema at same time also resolved. Over same time course has noted weakness in his R thigh, sensation that he "doesn't have control over my right foot" and heat sensation in LLE. S/p CT brain recently to eval for ?CVA. NSAIDs and steroids never improved either process. \par \par + FH cousins with SLE, sarcoid \par \par Imaging: \par - Duplex  6/17/19 did not reveal any DVT in the RUE.\par - U/S 6/17/19 + mild to moderate tenosynovitis of common extensor tendons without mass or ganglion.\par - X-rays of right hand 4/19/19 - no significant degenerative changes with no acute fracture or dislocation.\par

## 2019-12-23 NOTE — PHYSICAL EXAM
[General Appearance - Well Nourished] : well nourished [General Appearance - In No Acute Distress] : in no acute distress [General Appearance - Alert] : alert [Sclera] : the sclera and conjunctiva were normal [Extraocular Movements] : extraocular movements were intact [PERRL With Normal Accommodation] : pupils were equal in size, round, and reactive to light [Outer Ear] : the ears and nose were normal in appearance [Nasal Cavity] : the nasal mucosa and septum were normal [Oropharynx] : the oropharynx was normal [Neck Appearance] : the appearance of the neck was normal [Auscultation Breath Sounds / Voice Sounds] : lungs were clear to auscultation bilaterally [Heart Rate And Rhythm] : heart rate was normal and rhythm regular [Heart Sounds] : normal S1 and S2 [Murmurs] : no murmurs [Heart Sounds Gallop] : no gallops [Heart Sounds Pericardial Friction Rub] : no pericardial rub [Full Pulse] : the pedal pulses are present [Edema] : there was no peripheral edema [Bowel Sounds] : normal bowel sounds [Abdomen Soft] : soft [Abdomen Tenderness] : non-tender [Abdomen Mass (___ Cm)] : no abdominal mass palpated [Cervical Lymph Nodes Enlarged Anterior Bilaterally] : anterior cervical [Cervical Lymph Nodes Enlarged Posterior Bilaterally] : posterior cervical [No CVA Tenderness] : no ~M costovertebral angle tenderness [Supraclavicular Lymph Nodes Enlarged Bilaterally] : supraclavicular [No Spinal Tenderness] : no spinal tenderness [Motor Tone] : muscle strength and tone were normal [Nail Clubbing] : no clubbing  or cyanosis of the fingernails [Skin Color & Pigmentation] : normal skin color and pigmentation [Cranial Nerves] : cranial nerves 2-12 were intact [] : no rash [Oriented To Time, Place, And Person] : oriented to person, place, and time [Impaired Insight] : insight and judgment were intact [Affect] : the affect was normal [FreeTextEntry1] : lumbar ROM intact, no paraspinal spasm or TTP

## 2020-01-23 ENCOUNTER — APPOINTMENT (OUTPATIENT)
Dept: RHEUMATOLOGY | Facility: CLINIC | Age: 60
End: 2020-01-23
Payer: MEDICARE

## 2020-01-23 VITALS — DIASTOLIC BLOOD PRESSURE: 78 MMHG | SYSTOLIC BLOOD PRESSURE: 124 MMHG | RESPIRATION RATE: 14 BRPM | HEART RATE: 82 BPM

## 2020-01-23 DIAGNOSIS — G81.90 HEMIPLEGIA, UNSPECIFIED AFFECTING UNSPECIFIED SIDE: ICD-10-CM

## 2020-01-23 DIAGNOSIS — M19.011 PRIMARY OSTEOARTHRITIS, RIGHT SHOULDER: ICD-10-CM

## 2020-01-23 DIAGNOSIS — R22.31 LOCALIZED SWELLING, MASS AND LUMP, RIGHT UPPER LIMB: ICD-10-CM

## 2020-01-23 PROCEDURE — 99214 OFFICE O/P EST MOD 30 MIN: CPT

## 2020-01-23 NOTE — ASSESSMENT
[FreeTextEntry1] : FRANCIS HANCOCK is a 58 year old man being seen for multiple issues including self-limited synovitis of R hand without response to anti-inflammatories, persistent RLE proximal weakness, and persistent LLE paresthesias now with extension to L torso. Workup to date with low titer + RF and elevated ferritin, otherwise negative for SLE, thyroid, CPK, ANCA, sarcoid, infectious etiology. Sarcoid remains in differential but should have had partial response to steroids. ?IBM but this explains the weakness but not the synovitis or paresthesias, ?ALS but again explains the motor symptoms but not the joint inflammation or sensory issues. Difficult to determine unifying dx at present. New C spine lesion on recent repeat MRI, MS remains highest on the DDx, but recent neurology eval not as sure if this is the etiology, unclear if responsive to recent IV meds. OA in R shoulder with limitation to ADLs and already with overall difficulty in ADLs 2/2 neuro symptoms, not improved with IA injection. \par \par - f/u with neurology -- Dr Branch and original neurologist -- will obtain records from Dr Rae\par - f/u orthopedics -- has recommended spinal sx but urged to d/w neurology first \par - to see PMR for rehab options, to maintain ROM and muscle strength \par - XR pelvis and hips as mild pain with RLE use\par - trial of gabapentin 300mg QHS for neuropathic pain on L side of body \par - RTC in 4 months to re-assess

## 2020-01-23 NOTE — HISTORY OF PRESENT ILLNESS
[FreeTextEntry1] : FRANCIS HANCOCK is a 58 year old man initially seen for abrupt onset of pain and swelling in R wrist, R PIPs and DIPs which self improved, but still has some mild soft tissue fullness compared to other hand tho this keeps improving as time goes on, was not responsive to steroids. R shoulder achiness and R foot edema at same time also resolved. Over same time course has noted weakness in his R thigh, sensation that he "doesn't have control over my right foot" and heat sensation in LLE. NSAIDs and steroids never improved either process. Has now had multiple neurology evaluations and etiology remains unclear, repeat MRI however with white matter change in C spine that could be related to current neurological sx.\par \par Today with unchanged spastic weakness in RLE and unable to bring leg into body when raised as it naturally kicks out and has pain in R hip and milder in L hip when he tries this, heat sensation changes in L side of body, both exacerbated with neck extension. No UE weakness, no weakness in LLE. No rashes, no night sweats, F/C, worsening fatigue, unintentional weight loss, CP, SOB, cough, dysphagia. SLE ROS remains negative. \par \par Most recent neuro eval with Dr Branch at [facility], s/p 4 days of IV steroids. Reports he did not feel improvement. + HA with first day of tx, 5 day avoided as pt developed HTN. \par \par + R shoulder pain and limited ROM, not improved with IA injection last time. Stable since last visit. \par \par + FH cousins with SLE, sarcoid \par \par Imaging: \par - Duplex  6/17/19 did not reveal any DVT in the RUE.\par - U/S 6/17/19 + mild to moderate tenosynovitis of common extensor tendons without mass or ganglion.\par - X-rays of right hand 4/19/19 - no significant degenerative changes with no acute fracture or dislocation.\par

## 2020-01-23 NOTE — PHYSICAL EXAM
[General Appearance - Alert] : alert [General Appearance - In No Acute Distress] : in no acute distress [General Appearance - Well Nourished] : well nourished [Sclera] : the sclera and conjunctiva were normal [PERRL With Normal Accommodation] : pupils were equal in size, round, and reactive to light [Extraocular Movements] : extraocular movements were intact [Outer Ear] : the ears and nose were normal in appearance [Nasal Cavity] : the nasal mucosa and septum were normal [Oropharynx] : the oropharynx was normal [Neck Appearance] : the appearance of the neck was normal [Auscultation Breath Sounds / Voice Sounds] : lungs were clear to auscultation bilaterally [Heart Rate And Rhythm] : heart rate was normal and rhythm regular [Heart Sounds Gallop] : no gallops [Heart Sounds] : normal S1 and S2 [Murmurs] : no murmurs [Heart Sounds Pericardial Friction Rub] : no pericardial rub [Full Pulse] : the pedal pulses are present [Edema] : there was no peripheral edema [Bowel Sounds] : normal bowel sounds [Abdomen Soft] : soft [Abdomen Tenderness] : non-tender [Cervical Lymph Nodes Enlarged Anterior Bilaterally] : anterior cervical [Supraclavicular Lymph Nodes Enlarged Bilaterally] : supraclavicular [No CVA Tenderness] : no ~M costovertebral angle tenderness [No Spinal Tenderness] : no spinal tenderness [Nail Clubbing] : no clubbing  or cyanosis of the fingernails [Motor Tone] : muscle strength and tone were normal [Skin Color & Pigmentation] : normal skin color and pigmentation [] : no rash [Cranial Nerves] : cranial nerves 2-12 were intact [Oriented To Time, Place, And Person] : oriented to person, place, and time [Impaired Insight] : insight and judgment were intact [Affect] : the affect was normal [FreeTextEntry1] : CN 2-12 intact, no facial asymmetry, see MSK exam

## 2020-12-15 ENCOUNTER — APPOINTMENT (OUTPATIENT)
Dept: ENDOCRINOLOGY | Facility: CLINIC | Age: 60
End: 2020-12-15
Payer: MEDICARE

## 2020-12-15 VITALS
DIASTOLIC BLOOD PRESSURE: 80 MMHG | RESPIRATION RATE: 14 BRPM | TEMPERATURE: 98.3 F | BODY MASS INDEX: 26.92 KG/M2 | WEIGHT: 171.5 LBS | HEIGHT: 67 IN | SYSTOLIC BLOOD PRESSURE: 150 MMHG | OXYGEN SATURATION: 100 % | HEART RATE: 60 BPM

## 2020-12-15 PROCEDURE — 99203 OFFICE O/P NEW LOW 30 MIN: CPT

## 2020-12-15 RX ORDER — SILDENAFIL 20 MG/1
20 TABLET ORAL
Qty: 30 | Refills: 0 | Status: DISCONTINUED | COMMUNITY
Start: 2019-09-11 | End: 2020-12-15

## 2020-12-15 RX ORDER — TAMSULOSIN HYDROCHLORIDE 0.4 MG/1
0.4 CAPSULE ORAL
Refills: 0 | Status: ACTIVE | COMMUNITY

## 2020-12-15 NOTE — ASSESSMENT
[FreeTextEntry1] : BELÉN : he had 4 cm nodule discovered during Ct neck \par will check thyroid US \par check tfts today \par no family h/o thyroid cancer, no neck XRT\par no dysphagia, no dysphonia, no neck pain, no voice change\par will FNA since 4 cm but after thyroid US \par \par Overweight \par discussed diet and exercise\par encouraged more exercise walking 30 min 3 x week\par Needs to try to have more protein for meals\par \par

## 2020-12-15 NOTE — PHYSICAL EXAM
[Alert] : alert [Well Nourished] : well nourished [No Acute Distress] : no acute distress [Well Developed] : well developed [Normal Sclera/Conjunctiva] : normal sclera/conjunctiva [EOMI] : extra ocular movement intact [No Proptosis] : no proptosis [Normal Oropharynx] : the oropharynx was normal [Thyroid Not Enlarged] : the thyroid was not enlarged [No Respiratory Distress] : no respiratory distress [No Accessory Muscle Use] : no accessory muscle use [Clear to Auscultation] : lungs were clear to auscultation bilaterally [Normal S1, S2] : normal S1 and S2 [Normal Rate] : heart rate was normal [Regular Rhythm] : with a regular rhythm [No Edema] : no peripheral edema [Pedal Pulses Normal] : the pedal pulses are present [Normal Bowel Sounds] : normal bowel sounds [Not Tender] : non-tender [Not Distended] : not distended [Soft] : abdomen soft [Normal Anterior Cervical Nodes] : no anterior cervical lymphadenopathy [Normal Posterior Cervical Nodes] : no posterior cervical lymphadenopathy [Spine Straight] : spine straight [No Stigmata of Cushings Syndrome] : no stigmata of Cushings Syndrome [Normal Gait] : normal gait [No Rash] : no rash [No Tremors] : no tremors [Oriented x3] : oriented to person, place, and time [Acanthosis Nigricans] : no acanthosis nigricans [de-identified] : L thyroid nodule

## 2020-12-15 NOTE — HISTORY OF PRESENT ILLNESS
[FreeTextEntry1] : quality: BELÉN\par onset 2020\par severity: moderate \par modifying factors: none  \par associated factors: none \par

## 2020-12-15 NOTE — REVIEW OF SYSTEMS
[Negative] : Heme/Lymph [Difficulty Walking] : difficulty walking [Poor Balance] : poor balance [de-identified] : since neck surgery , tingling on l side

## 2020-12-16 ENCOUNTER — APPOINTMENT (OUTPATIENT)
Dept: ULTRASOUND IMAGING | Facility: CLINIC | Age: 60
End: 2020-12-16
Payer: MEDICARE

## 2020-12-16 ENCOUNTER — OUTPATIENT (OUTPATIENT)
Dept: OUTPATIENT SERVICES | Facility: HOSPITAL | Age: 60
LOS: 1 days | End: 2020-12-16

## 2020-12-16 ENCOUNTER — RESULT REVIEW (OUTPATIENT)
Age: 60
End: 2020-12-16

## 2020-12-16 DIAGNOSIS — E05.10 THYROTOXICOSIS WITH TOXIC SINGLE THYROID NODULE WITHOUT THYROTOXIC CRISIS OR STORM: ICD-10-CM

## 2020-12-16 PROCEDURE — 76536 US EXAM OF HEAD AND NECK: CPT | Mod: 26

## 2020-12-18 ENCOUNTER — NON-APPOINTMENT (OUTPATIENT)
Age: 60
End: 2020-12-18

## 2021-01-15 ENCOUNTER — APPOINTMENT (OUTPATIENT)
Dept: ENDOCRINOLOGY | Facility: CLINIC | Age: 61
End: 2021-01-15
Payer: MEDICARE

## 2021-01-15 ENCOUNTER — LABORATORY RESULT (OUTPATIENT)
Age: 61
End: 2021-01-15

## 2021-01-15 PROCEDURE — 10005 FNA BX W/US GDN 1ST LES: CPT

## 2021-01-15 NOTE — PROCEDURE
[Fine Needle Aspiration] : Fine needle aspiration ~T ~C was performed. [Area of Mass: ______] : mass identified in the [unfilled] [Risks] : risks [Benefits] : benefits [Alternatives] : alternatives [Consent Obtained] : Written consent was obtained prior to the procedure and is detailed in the patient's record [Patient] : the patient [Ethyl Chloride] : ethyl chloride [Supine] : The patient was placed in the supine position with the neck extended as tolerated. [Alcohol] : with alcohol [27 gauge 1.5 inch] : A 27 gauge 1.5 inch needle was used [3 Passes] : 3 passes were made through the mass [Ultrasonic Guidance] : ultrasound guidance was employed [Sent to Histology] : The specimens were prepared in the usual manner and sent to histology. [Thyroseq] : Thyroseq [Tolerated Well] : the patient tolerated the procedure well [Vital Signs Stable] : the vital signs were stable [Hemostasis] : hemostasis was assured and the patient was discharged in satisfactory condition [No Complications] : There were no complications

## 2021-02-09 ENCOUNTER — APPOINTMENT (OUTPATIENT)
Dept: OTOLARYNGOLOGY | Facility: CLINIC | Age: 61
End: 2021-02-09
Payer: MEDICARE

## 2021-02-09 VITALS — DIASTOLIC BLOOD PRESSURE: 80 MMHG | TEMPERATURE: 97.8 F | SYSTOLIC BLOOD PRESSURE: 148 MMHG | HEART RATE: 67 BPM

## 2021-02-09 DIAGNOSIS — E04.9 NONTOXIC GOITER, UNSPECIFIED: ICD-10-CM

## 2021-02-09 DIAGNOSIS — M50.20 OTHER CERVICAL DISC DISPLACEMENT, UNSPECIFIED CERVICAL REGION: ICD-10-CM

## 2021-02-09 PROCEDURE — 99203 OFFICE O/P NEW LOW 30 MIN: CPT

## 2021-02-09 RX ORDER — GABAPENTIN 300 MG/1
300 CAPSULE ORAL
Qty: 30 | Refills: 3 | Status: DISCONTINUED | COMMUNITY
Start: 2020-01-23 | End: 2021-02-09

## 2021-02-09 NOTE — CONSULT LETTER
[Dear  ___] : Dear  [unfilled], [Consult Letter:] : I had the pleasure of evaluating your patient, [unfilled]. [Please see my note below.] : Please see my note below. [Sincerely,] : Sincerely, [FreeTextEntry2] : Aziza Gallagher MD ([place], [place]) [FreeTextEntry3] : Jaime Eugene MD, FACS\par \par    [facility]\par Associate Chair\par    Department of Otolaryngology\par \par Professor\par Otolaryngology & Molecular Medicine\par [employer] [facility] [employer]\par

## 2021-02-09 NOTE — HISTORY OF PRESENT ILLNESS
[de-identified] : Mr. jimenez is a 59 yo male who is being referred by Dr. Gallagher (Endocrinologist) for multinodular goiter.\par A 4cm left thyroid nodule was noted during a Ct scan C-spine on 6/10/2020\par On 12/18/2020 a sonogram showed a right thyroid lobe 4mm cystid mid gland nodule and  a 5.0 cm dominant left lobe thyroid nodule and this was biopsied (FNA) on 1/15/2021 showing benign findings (CategoryII)\par 12/15/2020 TFT wnr. Pt denies pain, dysphagia, no voice changes. \par \par no resp or swallow issues. this was an incidental finding.

## 2021-07-13 ENCOUNTER — APPOINTMENT (OUTPATIENT)
Dept: OTOLARYNGOLOGY | Facility: CLINIC | Age: 61
End: 2021-07-13
Payer: MEDICARE

## 2021-07-13 VITALS
DIASTOLIC BLOOD PRESSURE: 75 MMHG | TEMPERATURE: 98 F | RESPIRATION RATE: 15 BRPM | HEART RATE: 70 BPM | OXYGEN SATURATION: 99 % | SYSTOLIC BLOOD PRESSURE: 116 MMHG

## 2021-07-13 PROCEDURE — 99213 OFFICE O/P EST LOW 20 MIN: CPT

## 2021-07-13 NOTE — HISTORY OF PRESENT ILLNESS
[de-identified] : Mr. jimenez is a 62 yo male who is here for follow up. Referred by Dr. Gallagher (Endocrinologist) for multinodular goiter.\par A 4cm left thyroid nodule was noted during a Ct scan C-spine on 6/10/2020\par On 12/18/2020 a sonogram showed a right thyroid lobe 4mm cystid mid gland nodule and  a 5.0 cm dominant left lobe thyroid nodule and this was biopsied (FNA) on 1/15/2021 showing benign findings (CategoryII)\par 12/15/2020 TFT wnr. Pt denies pain, dysphagia, no voice changes. \par Ordered Biopsy for June, not done yet. \par \par no resp or swallow issues. this was an incidental finding.

## 2021-07-15 ENCOUNTER — APPOINTMENT (OUTPATIENT)
Dept: ENDOCRINOLOGY | Facility: CLINIC | Age: 61
End: 2021-07-15

## 2021-08-27 ENCOUNTER — RESULT REVIEW (OUTPATIENT)
Age: 61
End: 2021-08-27

## 2021-09-01 ENCOUNTER — APPOINTMENT (OUTPATIENT)
Dept: ULTRASOUND IMAGING | Facility: CLINIC | Age: 61
End: 2021-09-01
Payer: MEDICARE

## 2021-09-01 ENCOUNTER — RESULT REVIEW (OUTPATIENT)
Age: 61
End: 2021-09-01

## 2021-09-01 ENCOUNTER — OUTPATIENT (OUTPATIENT)
Dept: OUTPATIENT SERVICES | Facility: HOSPITAL | Age: 61
LOS: 1 days | End: 2021-09-01
Payer: MEDICARE

## 2021-09-01 DIAGNOSIS — E04.9 NONTOXIC GOITER, UNSPECIFIED: ICD-10-CM

## 2021-09-01 PROCEDURE — 10005 FNA BX W/US GDN 1ST LES: CPT

## 2021-09-03 ENCOUNTER — NON-APPOINTMENT (OUTPATIENT)
Age: 61
End: 2021-09-03

## 2021-09-03 DIAGNOSIS — E05.10 THYROTOXICOSIS WITH TOXIC SINGLE THYROID NODULE W/OUT THYROTOXIC CRISIS OR STORM: ICD-10-CM

## 2021-09-22 ENCOUNTER — NON-APPOINTMENT (OUTPATIENT)
Age: 61
End: 2021-09-22

## 2021-09-22 ENCOUNTER — APPOINTMENT (OUTPATIENT)
Dept: CARDIOLOGY | Facility: CLINIC | Age: 61
End: 2021-09-22
Payer: MEDICARE

## 2021-09-22 VITALS
OXYGEN SATURATION: 100 % | BODY MASS INDEX: 26.21 KG/M2 | HEART RATE: 62 BPM | SYSTOLIC BLOOD PRESSURE: 150 MMHG | HEIGHT: 67 IN | WEIGHT: 167 LBS | RESPIRATION RATE: 16 BRPM | DIASTOLIC BLOOD PRESSURE: 84 MMHG

## 2021-09-22 DIAGNOSIS — N40.0 BENIGN PROSTATIC HYPERPLASIA WITHOUT LOWER URINARY TRACT SYMPMS: ICD-10-CM

## 2021-09-22 DIAGNOSIS — M06.9 RHEUMATOID ARTHRITIS, UNSPECIFIED: ICD-10-CM

## 2021-09-22 PROCEDURE — 99204 OFFICE O/P NEW MOD 45 MIN: CPT

## 2021-09-22 PROCEDURE — 93000 ELECTROCARDIOGRAM COMPLETE: CPT

## 2021-09-22 NOTE — HISTORY OF PRESENT ILLNESS
[FreeTextEntry1] : Patient presents today primarily because he has been having edema in his right foot and a little bit into the leg for the last several months.  It seems to go up and down.  He has been trying to carefully watch his salt and elevate the leg but he still has issues with edema.  There is no pain in the foot.  He does note that he had a fall and injured his right hip significantly several years ago and since that time he has been having issues with weakness in the right leg around the right knee.  The edema was not an issue until more recently.  He has had a lot of work-up for that including neurological and orthopedic work-up with no clear diagnosis but he was told he has upper motor neuron weakness in the right thigh.  He occasionally gets a little bit of swelling on the left but this is not significant.  He reports no other symptoms at this time.  Patient denies chest pain, shortness of breath, palpitations, orthopnea, presyncope, syncope.

## 2021-09-22 NOTE — PHYSICAL EXAM
[Well Developed] : well developed [Well Nourished] : well nourished [No Acute Distress] : no acute distress [Normal Conjunctiva] : normal conjunctiva [Normal Venous Pressure] : normal venous pressure [No Carotid Bruit] : no carotid bruit [Normal S1, S2] : normal S1, S2 [No Murmur] : no murmur [No Rub] : no rub [No Gallop] : no gallop [Clear Lung Fields] : clear lung fields [Good Air Entry] : good air entry [No Respiratory Distress] : no respiratory distress  [Soft] : abdomen soft [Non Tender] : non-tender [No Masses/organomegaly] : no masses/organomegaly [Normal Bowel Sounds] : normal bowel sounds [Normal Gait] : normal gait [No Cyanosis] : no cyanosis [No Clubbing] : no clubbing [No Varicosities] : no varicosities [Moves all extremities] : moves all extremities [No Focal Deficits] : no focal deficits [Normal Speech] : normal speech [Alert and Oriented] : alert and oriented [Normal memory] : normal memory [de-identified] : 1+ right pedal edema

## 2021-09-22 NOTE — ASSESSMENT
[FreeTextEntry1] : EKG: Sinus rhythm with no significant ST or T wave changes.  Anteroseptal Q waves.  Left atrial enlargement.\par \par 61-year-old man with a past medical history of rheumatoid arthritis who presents to me for evaluation of predominantly unilateral right foot swelling.  Certainly ruling out DVT is important given the unilateral swelling.  More likely causes include venous insufficiency and arthritis in the foot.  This may also somehow be related to his bed fall on the right side and the injury that he sustained with continued issues of weakness on that side.  I have recommended follow-up with a new neurologist for further work-up of that.  Blood pressure is a bit elevated here today but has been controlled at other offices.  There is no evidence of heart failure at this time.  EKG is mildly abnormal with anteroseptal Q waves and left atrial enlargement.  Further work-up for this will be prudent as well.

## 2021-09-22 NOTE — DISCUSSION/SUMMARY
[FreeTextEntry1] : 1.  Check lower extremity venous Doppler to rule out DVT given his new lateral swelling.\par 2.  Check echocardiogram given his edema.\par 3.  Check exercise stress test given his anteroseptal Q waves and abnormal EKG.\par 4.  No additional cardiac medications at this time.\par 5.  Encouraged to elevate legs and use compression stockings to help with edema.\par 6.  Patient strongly encouraged on reducing salt intake.\par 7.  Will obtain most recent bloodwork.\par 8.  Follow up here after testing, and will make further recommendations at that time.

## 2021-09-30 ENCOUNTER — APPOINTMENT (OUTPATIENT)
Dept: CARDIOLOGY | Facility: CLINIC | Age: 61
End: 2021-09-30
Payer: MEDICARE

## 2021-09-30 PROCEDURE — 93970 EXTREMITY STUDY: CPT

## 2021-11-01 ENCOUNTER — APPOINTMENT (OUTPATIENT)
Dept: CARDIOLOGY | Facility: CLINIC | Age: 61
End: 2021-11-01

## 2021-12-01 ENCOUNTER — APPOINTMENT (OUTPATIENT)
Dept: CARDIOLOGY | Facility: CLINIC | Age: 61
End: 2021-12-01
Payer: SELF-PAY

## 2021-12-01 PROCEDURE — SNS01: CPT

## 2022-01-31 ENCOUNTER — APPOINTMENT (OUTPATIENT)
Dept: CARDIOLOGY | Facility: CLINIC | Age: 62
End: 2022-01-31
Payer: MEDICARE

## 2022-01-31 PROCEDURE — 93015 CV STRESS TEST SUPVJ I&R: CPT

## 2022-01-31 PROCEDURE — 93306 TTE W/DOPPLER COMPLETE: CPT

## 2022-02-09 ENCOUNTER — APPOINTMENT (OUTPATIENT)
Dept: CARDIOLOGY | Facility: CLINIC | Age: 62
End: 2022-02-09
Payer: MEDICARE

## 2022-02-09 VITALS
HEART RATE: 72 BPM | RESPIRATION RATE: 16 BRPM | SYSTOLIC BLOOD PRESSURE: 130 MMHG | BODY MASS INDEX: 25.58 KG/M2 | DIASTOLIC BLOOD PRESSURE: 82 MMHG | WEIGHT: 163 LBS | HEIGHT: 67 IN | OXYGEN SATURATION: 97 %

## 2022-02-09 DIAGNOSIS — E66.3 OVERWEIGHT: ICD-10-CM

## 2022-02-09 DIAGNOSIS — R94.31 ABNORMAL ELECTROCARDIOGRAM [ECG] [EKG]: ICD-10-CM

## 2022-02-09 DIAGNOSIS — R60.0 LOCALIZED EDEMA: ICD-10-CM

## 2022-02-09 DIAGNOSIS — Z00.00 ENCOUNTER FOR GENERAL ADULT MEDICAL EXAMINATION W/OUT ABNORMAL FINDINGS: ICD-10-CM

## 2022-02-09 PROCEDURE — 99213 OFFICE O/P EST LOW 20 MIN: CPT

## 2022-02-09 NOTE — DISCUSSION/SUMMARY
[FreeTextEntry1] : 1.  No additional cardiac testing at this time.\par 2.  No additional cardiac medications at this time.\par 3.  Monitor BP at home, keep a log and bring to f/u.\par 4.  Encouraged to elevate legs and use compression stockings to help with edema.\par 5.  Follow-up with his primary regarding the prominent lymph nodes in his right groin.\par 6.  Patient strongly encouraged on reducing salt intake.\par 7.  He will have blood work done.\par 8.  Follow-up with neurology.\par 9.  Follow up here in three months.

## 2022-02-09 NOTE — PHYSICAL EXAM
[Well Developed] : well developed [Well Nourished] : well nourished [No Acute Distress] : no acute distress [Normal Conjunctiva] : normal conjunctiva [Normal Venous Pressure] : normal venous pressure [No Carotid Bruit] : no carotid bruit [Normal S1, S2] : normal S1, S2 [No Murmur] : no murmur [No Rub] : no rub [No Gallop] : no gallop [Clear Lung Fields] : clear lung fields [Good Air Entry] : good air entry [No Respiratory Distress] : no respiratory distress  [Soft] : abdomen soft [Non Tender] : non-tender [No Masses/organomegaly] : no masses/organomegaly [Normal Bowel Sounds] : normal bowel sounds [Normal Gait] : normal gait [No Cyanosis] : no cyanosis [No Clubbing] : no clubbing [No Varicosities] : no varicosities [Moves all extremities] : moves all extremities [No Focal Deficits] : no focal deficits [Normal Speech] : normal speech [Alert and Oriented] : alert and oriented [Normal memory] : normal memory [de-identified] : 1+ right pedal edema

## 2022-02-09 NOTE — ASSESSMENT
[FreeTextEntry1] : Echocardiogram January 31, 2022 demonstrated left ventricle normal size and function with ejection fraction of 55 to 60%.  Mildly dilated left atrium.  Trace mitral, mild tricuspid and trace pulmonic regurgitation noted.\par \par Exercise stress test January 31, 2022 during which the patient exercised via a Wilver protocol for 7 minutes and 15 seconds, totaling seven METS.  Peak heart rate achieved was 125 bpm, representing 78% of age-predicted maximum.  Blood pressure response to exercise was normal.  EKG shows no evidence of ischemia with exercise although patient achieved a submaximal heart rate.\par \par Lower extremity venous Doppler September 30, 2021 showed no evidence of DVT bilaterally.  Prominent lymph nodes noted in the right groin.\par \par 61-year-old man with a past medical history of rheumatoid arthritis who presented to me for evaluation of predominantly unilateral right foot swelling.  There is no evidence of DVT and his echocardiogram shows a normal EF with no significant valve disease.  The edema in his foot is likely secondary to combination of arthritis in his ankle and possibly some venous insufficiency.  He was noted to have some lymph nodes in his right groin that were prominent and I have encouraged him to follow-up with his primary.  Exercise stress testing shows no evidence of ischemia although he did achieve a submaximal exercise due to the issues with his right lower extremity.  At this time given his lack of symptoms and the lack of EKG changes at the achieved exercise I do not believe additional work-up is needed and the patient agrees.  He did have some little left atrial enlargement on echo and his blood pressures have been somewhat elevated when he comes to me.  I would not add any medication for now but I have asked him to start monitoring it at home.

## 2022-05-18 ENCOUNTER — APPOINTMENT (OUTPATIENT)
Dept: CARDIOLOGY | Facility: CLINIC | Age: 62
End: 2022-05-18

## 2022-12-07 ENCOUNTER — APPOINTMENT (OUTPATIENT)
Dept: UROLOGY | Facility: CLINIC | Age: 62
End: 2022-12-07

## 2022-12-07 VITALS
BODY MASS INDEX: 24.64 KG/M2 | SYSTOLIC BLOOD PRESSURE: 149 MMHG | OXYGEN SATURATION: 99 % | DIASTOLIC BLOOD PRESSURE: 84 MMHG | HEIGHT: 67 IN | HEART RATE: 60 BPM | WEIGHT: 157 LBS

## 2022-12-07 DIAGNOSIS — C61 MALIGNANT NEOPLASM OF PROSTATE: ICD-10-CM

## 2022-12-07 PROCEDURE — 99203 OFFICE O/P NEW LOW 30 MIN: CPT

## 2022-12-07 NOTE — HISTORY OF PRESENT ILLNESS
[FreeTextEntry1] : saw Dr Juarez Urology\par Had prostate biopsy \par Had prostate cancer\par Omi 6\par PSA 17.10\par

## 2022-12-07 NOTE — ASSESSMENT
[FreeTextEntry1] : Has not seen a Urologist since last Ferruary when he was diagnosed with prostate cancer\par \par PSA very high    he has not followed up.    Was in [place] and now he is back\par We discussed the need for a new baseline \par Needs PSA and a prostate MRI

## 2022-12-12 ENCOUNTER — RESULT REVIEW (OUTPATIENT)
Age: 62
End: 2022-12-12

## 2022-12-12 ENCOUNTER — APPOINTMENT (OUTPATIENT)
Dept: MRI IMAGING | Facility: CLINIC | Age: 62
End: 2022-12-12
Payer: MEDICARE

## 2022-12-12 ENCOUNTER — OUTPATIENT (OUTPATIENT)
Dept: OUTPATIENT SERVICES | Facility: HOSPITAL | Age: 62
LOS: 1 days | End: 2022-12-12
Payer: MEDICARE

## 2022-12-12 DIAGNOSIS — C61 MALIGNANT NEOPLASM OF PROSTATE: ICD-10-CM

## 2022-12-12 PROCEDURE — 72197 MRI PELVIS W/O & W/DYE: CPT

## 2022-12-12 PROCEDURE — A9585: CPT

## 2022-12-12 PROCEDURE — 72197 MRI PELVIS W/O & W/DYE: CPT | Mod: 26,MH

## 2022-12-12 PROCEDURE — 76498P: CUSTOM | Mod: 26,MH

## 2022-12-12 PROCEDURE — 76498 UNLISTED MR PROCEDURE: CPT

## 2022-12-19 LAB
PSA FREE FLD-MCNC: 8 %
PSA FREE SERPL-MCNC: 1.54 NG/ML
PSA SERPL-MCNC: 19 NG/ML

## 2022-12-21 ENCOUNTER — APPOINTMENT (OUTPATIENT)
Dept: UROLOGY | Facility: CLINIC | Age: 62
End: 2022-12-21
Payer: MEDICARE

## 2022-12-21 ENCOUNTER — RESULT REVIEW (OUTPATIENT)
Age: 62
End: 2022-12-21

## 2022-12-21 PROCEDURE — 99214 OFFICE O/P EST MOD 30 MIN: CPT

## 2022-12-27 NOTE — HISTORY OF PRESENT ILLNESS
[FreeTextEntry1] : 63 yo presents today for an initial visit for prostate cancer. \par Previous patient of Dr. Juarez\par 2.22.22. Prostate biopsy performed.\par GS 6 (3+3) x 5 cores.\par PSA 19.00\par Free 8 % (12.12.22.)\par Previous PSA 17.10 (4.7.22.)\par MRI prostate 12.12.22.\par PV 34 mL\par PIRADS 5 lesion.\par Here to discuss plan of care.

## 2022-12-27 NOTE — ASSESSMENT
[FreeTextEntry1] : \par We discussed all treatment options for treatment of localized prostate cancer.   \par These included active surveillance, radiation therapy, IMRT and Cyberknife and radical prostatectomy\par We discussed the risks, benefits and complications of radiation therapy.   We discussed the option of robotic radical prostatectomy and the advantages and disadvantages.   We discussed the risks, benefits and alternatives and complications specifically impotence and incontinence.  We discussed the procedure, in hospital stay and the recovery and expectations.  We discussed my experience with this procedure and my outcomes.\par We discussed the cancer outcomes of each options as well.\par PSA 19\par Prostate cancer 5 cores [place] 6 on outside biopsy\par Slides need to be reviewed \par We discussed all options in detail\par He will need a PET PSMA scan\par He will then followup

## 2023-01-05 ENCOUNTER — OUTPATIENT (OUTPATIENT)
Dept: OUTPATIENT SERVICES | Facility: HOSPITAL | Age: 63
LOS: 1 days | End: 2023-01-05

## 2023-01-05 DIAGNOSIS — C61 MALIGNANT NEOPLASM OF PROSTATE: ICD-10-CM

## 2023-01-23 ENCOUNTER — OUTPATIENT (OUTPATIENT)
Dept: OUTPATIENT SERVICES | Facility: HOSPITAL | Age: 63
LOS: 1 days | End: 2023-01-23

## 2023-01-23 ENCOUNTER — APPOINTMENT (OUTPATIENT)
Dept: NUCLEAR MEDICINE | Facility: CLINIC | Age: 63
End: 2023-01-23

## 2023-01-23 DIAGNOSIS — C61 MALIGNANT NEOPLASM OF PROSTATE: ICD-10-CM

## 2023-01-27 ENCOUNTER — OUTPATIENT (OUTPATIENT)
Dept: OUTPATIENT SERVICES | Facility: HOSPITAL | Age: 63
LOS: 1 days | End: 2023-01-27

## 2023-01-27 ENCOUNTER — APPOINTMENT (OUTPATIENT)
Dept: NUCLEAR MEDICINE | Facility: CLINIC | Age: 63
End: 2023-01-27
Payer: MEDICARE

## 2023-01-27 ENCOUNTER — RESULT REVIEW (OUTPATIENT)
Age: 63
End: 2023-01-27

## 2023-01-27 DIAGNOSIS — C61 MALIGNANT NEOPLASM OF PROSTATE: ICD-10-CM

## 2023-01-27 PROCEDURE — 78816 PET IMAGE W/CT FULL BODY: CPT | Mod: 26,MH

## 2023-01-31 ENCOUNTER — NON-APPOINTMENT (OUTPATIENT)
Age: 63
End: 2023-01-31

## 2023-03-06 ENCOUNTER — APPOINTMENT (OUTPATIENT)
Dept: UROLOGY | Facility: CLINIC | Age: 63
End: 2023-03-06
Payer: MEDICARE

## 2023-03-06 PROCEDURE — 99214 OFFICE O/P EST MOD 30 MIN: CPT

## 2023-03-07 NOTE — ASSESSMENT
[FreeTextEntry1] : We discussed all treatment options for treatment of localized prostate cancer.   \par These included active surveillance, radiation therapy, IMRT and Cyberknife and radical prostatectomy\par We discussed the risks, benefits and complications of radiation therapy.   We discussed the option of robotic radical prostatectomy and the advantages and disadvantages.   We discussed the risks, benefits and alternatives and complications specifically impotence and incontinence.  We discussed the procedure, in hospital stay and the recovery and expectations.  We discussed my experience with this procedure and my outcomes.\par We discussed the cancer outcomes of each options as well.\par PSA 19\par Prostate cancer 5 cores Omi 6 on outside biopsy\par  \par We discussed all options in detail\par Patient would like to meet with radiation oncology team. \par Referral placed. \par

## 2023-03-07 NOTE — HISTORY OF PRESENT ILLNESS
[FreeTextEntry1] : 63 yo presents today for an follow up visit for prostate cancer. \par Previous patient of Dr. Juarez\par 2.22.22. Prostate biopsy performed., GS 6 (3+3) x 5 cores. PSA 19.00 Free 8 % (12.12.22.)\par Previous PSA 17.10 (4.7.22.)\par MRI prostate 12.12.22.\par PV 34 mL\par PIRADS 5 lesion. Biopsy showed Omi 6 in 5 cores \par Here to review PSMA PET scan and  discuss plan of care. PET scan did not show any disease outside of prostate. \par

## 2023-03-28 NOTE — HISTORY OF PRESENT ILLNESS
[FreeTextEntry1] : The patient is a pleasant 62 year old male diagnosed with prostate cancer.\par \par Patient was diagnosed with prostate cancer in February 2022 and did not f/u with the urologist right away.\par  \par Prostate biopsy 2/22/22 demonstrated adenocarcinoma in 4/12 cores, GS 3=3=6.\par PSA 01/25/22 = 15.51\par PSA 4/7/22 = 17.10\par PSA 12/12/22=19.00\par \par MRI prostate 12/12/22 showed prostate size 4.4 x 3.8 x 3.9 cm, volume 34 ml. Lesion location midline, anterior, base to apex, transition zone, extraprostatic extension: tumor bulges, deforms or obscures capsule with or without neurovascular bundle thickening.Pi RADS 5. No evidence of neurovascular bundle invasion. No SV invasion, no pelvic adenopathy. \par \par PET/PSMA done 1/27/23 revealed focal activity in the left side of the prostate gland anteriorly compatible with biopsied prostate malignancy. No evidence of sandra or osseous metastasis. Multiple small foci of increased activity localizing to foramina of L5 and sacrum without distinct low-dose CT abnormality most likely representing physiologic activity in neurovascular structures. \par \par Patient presents to discuss the role of radiation therapy in his care.\par

## 2023-03-31 ENCOUNTER — APPOINTMENT (OUTPATIENT)
Dept: RADIATION ONCOLOGY | Facility: CLINIC | Age: 63
End: 2023-03-31

## 2023-04-14 ENCOUNTER — APPOINTMENT (OUTPATIENT)
Dept: RADIATION ONCOLOGY | Facility: CLINIC | Age: 63
End: 2023-04-14

## 2023-10-09 ENCOUNTER — APPOINTMENT (OUTPATIENT)
Dept: RHEUMATOLOGY | Facility: CLINIC | Age: 63
End: 2023-10-09
Payer: MEDICARE

## 2023-10-09 VITALS
HEART RATE: 58 BPM | SYSTOLIC BLOOD PRESSURE: 139 MMHG | DIASTOLIC BLOOD PRESSURE: 83 MMHG | TEMPERATURE: 96.6 F | OXYGEN SATURATION: 99 % | BODY MASS INDEX: 25.58 KG/M2 | HEIGHT: 67 IN | WEIGHT: 163 LBS

## 2023-10-09 DIAGNOSIS — R20.2 PARESTHESIA OF SKIN: ICD-10-CM

## 2023-10-09 DIAGNOSIS — R29.898 OTHER SYMPTOMS AND SIGNS INVOLVING THE MUSCULOSKELETAL SYSTEM: ICD-10-CM

## 2023-10-09 DIAGNOSIS — M25.551 PAIN IN RIGHT HIP: ICD-10-CM

## 2023-10-09 DIAGNOSIS — M25.552 PAIN IN RIGHT HIP: ICD-10-CM

## 2023-10-09 PROCEDURE — 99204 OFFICE O/P NEW MOD 45 MIN: CPT

## 2023-10-13 PROBLEM — R20.2 PARESTHESIA: Status: ACTIVE | Noted: 2019-06-27

## 2023-10-13 PROBLEM — R29.898 WEAKNESS OF RIGHT LOWER EXTREMITY: Status: ACTIVE | Noted: 2019-06-27

## 2025-05-17 ENCOUNTER — OFFICE (OUTPATIENT)
Dept: URBAN - METROPOLITAN AREA CLINIC 12 | Facility: CLINIC | Age: 65
Setting detail: OPHTHALMOLOGY
End: 2025-05-17
Payer: MEDICARE

## 2025-05-17 DIAGNOSIS — H25.13: ICD-10-CM

## 2025-05-17 DIAGNOSIS — H52.4: ICD-10-CM

## 2025-05-17 DIAGNOSIS — H40.1134: ICD-10-CM

## 2025-05-17 DIAGNOSIS — H16.223: ICD-10-CM

## 2025-05-17 PROCEDURE — 92133 CPTRZD OPH DX IMG PST SGM ON: CPT | Performed by: OPTOMETRIST

## 2025-05-17 PROCEDURE — 92015 DETERMINE REFRACTIVE STATE: CPT | Performed by: OPTOMETRIST

## 2025-05-17 PROCEDURE — 92004 COMPRE OPH EXAM NEW PT 1/>: CPT | Performed by: OPTOMETRIST

## 2025-05-17 ASSESSMENT — REFRACTION_AUTOREFRACTION
OS_CYLINDER: -1.25
OS_SPHERE: -1.25
OD_SPHERE: -1.00
OD_CYLINDER: -1.00
OD_AXIS: 081
OS_AXIS: 097

## 2025-05-17 ASSESSMENT — KERATOMETRY
OD_AXISANGLE_DEGREES: 037
OD_K1POWER_DIOPTERS: 42.50
OS_K2POWER_DIOPTERS: 42.25
OD_K2POWER_DIOPTERS: 42.75
OS_K1POWER_DIOPTERS: 42.25
OS_AXISANGLE_DEGREES: 090

## 2025-05-17 ASSESSMENT — REFRACTION_CURRENTRX
OS_CYLINDER: -0.75
OD_ADD: +2.75
OD_VPRISM_DIRECTION: PROGS
OD_AXIS: 088
OS_OVR_VA: 20/
OS_SPHERE: -2.50
OD_CYLINDER: -0.25
OD_OVR_VA: 20/
OS_AXIS: 091
OS_ADD: +2.75
OD_SPHERE: -2.00
OS_VPRISM_DIRECTION: PROGS

## 2025-05-17 ASSESSMENT — REFRACTION_MANIFEST
OS_AXIS: 095
OD_AXIS: 080
OD_SPHERE: -1.75
OD_VA1: 20/30
OS_VA1: 20/25
OS_ADD: +2.75
OD_CYLINDER: -0.25
OD_ADD: +2.75
OS_CYLINDER: -1.00
OS_SPHERE: -2.00

## 2025-05-17 ASSESSMENT — CONFRONTATIONAL VISUAL FIELD TEST (CVF)
OS_FINDINGS: FULL
OD_FINDINGS: FULL

## 2025-05-17 ASSESSMENT — SUPERFICIAL PUNCTATE KERATITIS (SPK)
OS_SPK: T
OD_SPK: T

## 2025-05-17 ASSESSMENT — VISUAL ACUITY
OS_BCVA: 20/40
OD_BCVA: 20/20

## 2025-05-29 ENCOUNTER — OFFICE (OUTPATIENT)
Dept: URBAN - METROPOLITAN AREA CLINIC 12 | Facility: CLINIC | Age: 65
Setting detail: OPHTHALMOLOGY
End: 2025-05-29
Payer: MEDICARE

## 2025-05-29 ENCOUNTER — RX ONLY (RX ONLY)
Age: 65
End: 2025-05-29

## 2025-05-29 DIAGNOSIS — H16.223: ICD-10-CM

## 2025-05-29 DIAGNOSIS — H25.13: ICD-10-CM

## 2025-05-29 DIAGNOSIS — H40.1134: ICD-10-CM

## 2025-05-29 PROBLEM — H52.7 REFRACTIVE ERROR: Status: ACTIVE | Noted: 2025-05-17

## 2025-05-29 PROCEDURE — 76514 ECHO EXAM OF EYE THICKNESS: CPT | Performed by: STUDENT IN AN ORGANIZED HEALTH CARE EDUCATION/TRAINING PROGRAM

## 2025-05-29 PROCEDURE — 92083 EXTENDED VISUAL FIELD XM: CPT | Performed by: STUDENT IN AN ORGANIZED HEALTH CARE EDUCATION/TRAINING PROGRAM

## 2025-05-29 PROCEDURE — 99213 OFFICE O/P EST LOW 20 MIN: CPT | Performed by: STUDENT IN AN ORGANIZED HEALTH CARE EDUCATION/TRAINING PROGRAM

## 2025-05-29 ASSESSMENT — PACHYMETRY
OS_CT_CORRECTION: 0
OS_CT_UM: 542
OD_CT_CORRECTION: -1
OD_CT_UM: 555

## 2025-05-29 ASSESSMENT — TONOMETRY
OS_IOP_MMHG: 16
OS_IOP_MMHG: 16
OD_IOP_MMHG: 21

## 2025-05-29 ASSESSMENT — CONFRONTATIONAL VISUAL FIELD TEST (CVF)
OS_FINDINGS: FULL
OD_FINDINGS: FULL

## 2025-05-29 ASSESSMENT — SUPERFICIAL PUNCTATE KERATITIS (SPK)
OS_SPK: T
OD_SPK: T

## 2025-05-30 ASSESSMENT — REFRACTION_MANIFEST
OS_AXIS: 095
OD_AXIS: 080
OS_VA1: 20/25
OD_VA1: 20/30
OD_SPHERE: -1.75
OD_CYLINDER: -0.25
OS_ADD: +2.75
OS_CYLINDER: -1.00
OS_SPHERE: -2.00
OD_ADD: +2.75

## 2025-05-30 ASSESSMENT — VISUAL ACUITY
OS_BCVA: 20/30-2
OD_BCVA: 20/25-3

## 2025-05-30 ASSESSMENT — REFRACTION_CURRENTRX
OS_VPRISM_DIRECTION: PROGS
OD_SPHERE: -2.00
OS_CYLINDER: -0.75
OD_AXIS: 088
OD_CYLINDER: -0.25
OD_VPRISM_DIRECTION: PROGS
OS_AXIS: 091
OD_ADD: +2.75
OS_SPHERE: -2.50
OD_OVR_VA: 20/
OS_ADD: +2.75
OS_OVR_VA: 20/

## 2025-05-30 ASSESSMENT — REFRACTION_AUTOREFRACTION
OS_AXIS: 096
OD_CYLINDER: -0.75
OD_AXIS: 100
OD_SPHERE: -1.50
OS_CYLINDER: -1.25
OS_SPHERE: -1.75

## 2025-05-30 ASSESSMENT — KERATOMETRY
OD_AXISANGLE_DEGREES: 027
OD_K1POWER_DIOPTERS: 42.50
OD_K2POWER_DIOPTERS: 42.75
OS_AXISANGLE_DEGREES: 160
OS_K1POWER_DIOPTERS: 42.00
OS_K2POWER_DIOPTERS: 42.75

## 2025-06-11 ENCOUNTER — OFFICE (OUTPATIENT)
Dept: URBAN - METROPOLITAN AREA CLINIC 12 | Facility: CLINIC | Age: 65
Setting detail: OPHTHALMOLOGY
End: 2025-06-11
Payer: MEDICARE

## 2025-06-11 ENCOUNTER — RX ONLY (RX ONLY)
Age: 65
End: 2025-06-11

## 2025-06-11 DIAGNOSIS — H40.1124: ICD-10-CM

## 2025-06-11 PROCEDURE — 65855 TRABECULOPLASTY LASER SURG: CPT | Mod: LT | Performed by: STUDENT IN AN ORGANIZED HEALTH CARE EDUCATION/TRAINING PROGRAM

## 2025-06-11 ASSESSMENT — REFRACTION_AUTOREFRACTION
OS_AXIS: 092
OD_SPHERE: -1.25
OD_AXIS: 084
OD_CYLINDER: -1.25
OS_CYLINDER: -1.25
OS_SPHERE: -1.25

## 2025-06-11 ASSESSMENT — REFRACTION_MANIFEST
OS_CYLINDER: -1.00
OD_ADD: +2.75
OS_SPHERE: -2.00
OD_AXIS: 080
OD_CYLINDER: -0.25
OS_ADD: +2.75
OD_SPHERE: -1.75
OS_AXIS: 095
OD_VA1: 20/30
OS_VA1: 20/25

## 2025-06-11 ASSESSMENT — REFRACTION_CURRENTRX
OD_VPRISM_DIRECTION: PROGS
OD_AXIS: 088
OS_OVR_VA: 20/
OS_ADD: +2.75
OS_VPRISM_DIRECTION: PROGS
OS_SPHERE: -2.50
OS_CYLINDER: -0.75
OD_OVR_VA: 20/
OD_SPHERE: -2.00
OD_ADD: +2.75
OD_CYLINDER: -0.25
OS_AXIS: 091

## 2025-06-11 ASSESSMENT — PACHYMETRY
OS_CT_CORRECTION: 0
OD_CT_CORRECTION: -1
OD_CT_UM: 555
OS_CT_UM: 542

## 2025-06-11 ASSESSMENT — TONOMETRY
OS_IOP_MMHG: 15
OD_IOP_MMHG: 11

## 2025-06-11 ASSESSMENT — KERATOMETRY
OS_K2POWER_DIOPTERS: 42.50
OD_K1POWER_DIOPTERS: 42.25
OS_K1POWER_DIOPTERS: 42.00
OD_K2POWER_DIOPTERS: 42.75
OS_AXISANGLE_DEGREES: 002
OD_AXISANGLE_DEGREES: 018

## 2025-06-11 ASSESSMENT — VISUAL ACUITY
OS_BCVA: 20/50
OD_BCVA: 20/30

## 2025-06-11 ASSESSMENT — CONFRONTATIONAL VISUAL FIELD TEST (CVF)
OS_FINDINGS: FULL
OD_FINDINGS: FULL

## 2025-06-25 ENCOUNTER — OFFICE (OUTPATIENT)
Dept: URBAN - METROPOLITAN AREA CLINIC 12 | Facility: CLINIC | Age: 65
Setting detail: OPHTHALMOLOGY
End: 2025-06-25
Payer: MEDICARE

## 2025-06-25 ENCOUNTER — RX ONLY (RX ONLY)
Age: 65
End: 2025-06-25

## 2025-06-25 DIAGNOSIS — H40.1114: ICD-10-CM

## 2025-06-25 PROBLEM — H40.1124 POAG; RIGHT EYE INDETERMINATE, LEFT EYE INDETERMINATE: Status: ACTIVE | Noted: 2025-06-11

## 2025-06-25 PROCEDURE — 65855 TRABECULOPLASTY LASER SURG: CPT | Mod: RT | Performed by: STUDENT IN AN ORGANIZED HEALTH CARE EDUCATION/TRAINING PROGRAM

## 2025-06-25 ASSESSMENT — REFRACTION_CURRENTRX
OD_VPRISM_DIRECTION: PROGS
OD_ADD: +2.75
OS_VPRISM_DIRECTION: PROGS
OD_SPHERE: -2.00
OD_CYLINDER: -0.25
OS_OVR_VA: 20/
OS_SPHERE: -2.50
OD_OVR_VA: 20/
OS_AXIS: 091
OD_AXIS: 088
OS_CYLINDER: -0.75
OS_ADD: +2.75

## 2025-06-25 ASSESSMENT — REFRACTION_MANIFEST
OS_AXIS: 095
OD_SPHERE: -1.75
OD_CYLINDER: -0.25
OD_AXIS: 080
OS_VA1: 20/25
OD_VA1: 20/30
OS_ADD: +2.75
OS_CYLINDER: -1.00
OD_ADD: +2.75
OS_SPHERE: -2.00

## 2025-06-25 ASSESSMENT — KERATOMETRY
OS_AXISANGLE_DEGREES: 171
OS_K2POWER_DIOPTERS: 42.25
OD_K1POWER_DIOPTERS: 42.00
OD_K2POWER_DIOPTERS: 42.50
OS_K1POWER_DIOPTERS: 41.50
OD_AXISANGLE_DEGREES: 025

## 2025-06-25 ASSESSMENT — PACHYMETRY
OS_CT_UM: 542
OS_CT_CORRECTION: 0
OD_CT_UM: 555
OD_CT_CORRECTION: -1

## 2025-06-25 ASSESSMENT — TONOMETRY
OD_IOP_MMHG: 16
OS_IOP_MMHG: 14

## 2025-06-25 ASSESSMENT — REFRACTION_AUTOREFRACTION
OS_SPHERE: -0.75
OD_SPHERE: -1.50
OD_AXIS: 128
OD_CYLINDER: -1.00
OS_AXIS: 093
OS_CYLINDER: -1.50

## 2025-06-25 ASSESSMENT — CONFRONTATIONAL VISUAL FIELD TEST (CVF)
OD_FINDINGS: FULL
OS_FINDINGS: FULL

## 2025-06-25 ASSESSMENT — VISUAL ACUITY
OS_BCVA: 20/30-2
OD_BCVA: 20/20-2

## 2025-07-12 NOTE — ASU PREOP CHECKLIST - BOWEL PREP
No care due was identified.  [facility] Embedded Care Due Messages. Reference number: [id number].   7/11/2025 11:53:50 AM CDT  
Refill Routing Note   Medication(s) are not appropriate for processing by [facility] for the following reason(s):        Due for refill >6 months ago- no adherence data    [facility] action(s):  Defer        Medication Therapy Plan: no adherence data reported      Appointments  past 12m or future 3m with PCP    Date Provider   Last Visit   3/28/2025 Jessika Randall MD   Next Visit   11/18/2025 Jessika Randall MD   ED visits in past 90 days: 0        Note composed:9:12 AM 07/12/2025          
n/a

## 2025-08-11 ENCOUNTER — OFFICE (OUTPATIENT)
Dept: URBAN - METROPOLITAN AREA CLINIC 12 | Facility: CLINIC | Age: 65
Setting detail: OPHTHALMOLOGY
End: 2025-08-11
Payer: MEDICARE

## 2025-08-11 DIAGNOSIS — H40.1114: ICD-10-CM

## 2025-08-11 DIAGNOSIS — H40.1124: ICD-10-CM

## 2025-08-11 PROCEDURE — 99213 OFFICE O/P EST LOW 20 MIN: CPT | Performed by: STUDENT IN AN ORGANIZED HEALTH CARE EDUCATION/TRAINING PROGRAM

## 2025-08-11 PROCEDURE — 92133 CPTRZD OPH DX IMG PST SGM ON: CPT | Performed by: STUDENT IN AN ORGANIZED HEALTH CARE EDUCATION/TRAINING PROGRAM

## 2025-08-11 ASSESSMENT — REFRACTION_MANIFEST
OD_VA1: 20/30
OD_CYLINDER: -0.25
OS_ADD: +2.75
OD_ADD: +2.75
OS_VA1: 20/25
OS_CYLINDER: -1.00
OS_SPHERE: -2.00
OS_AXIS: 095
OD_SPHERE: -1.75
OD_AXIS: 080

## 2025-08-11 ASSESSMENT — REFRACTION_CURRENTRX
OD_AXIS: 088
OD_ADD: +2.75
OS_ADD: +2.75
OD_CYLINDER: -0.50
OS_CYLINDER: -0.50
OS_AXIS: 109
OD_VPRISM_DIRECTION: PROGS
OS_SPHERE: -2.25
OS_VPRISM_DIRECTION: PROGS
OD_SPHERE: -2.25
OS_OVR_VA: 20/
OD_OVR_VA: 20/

## 2025-08-11 ASSESSMENT — CONFRONTATIONAL VISUAL FIELD TEST (CVF)
OS_FINDINGS: FULL
OD_FINDINGS: FULL

## 2025-08-11 ASSESSMENT — KERATOMETRY
OD_AXISANGLE_DEGREES: 46
OS_K1POWER_DIOPTERS: 41.75
OD_K2POWER_DIOPTERS: 43.00
OD_K1POWER_DIOPTERS: 42.50
OS_AXISANGLE_DEGREES: 3
OS_K2POWER_DIOPTERS: 42.50

## 2025-08-11 ASSESSMENT — REFRACTION_AUTOREFRACTION
OS_SPHERE: -0.75
OS_AXIS: 88
OD_CYLINDER: -0.25
OD_AXIS: 110
OS_CYLINDER: -1.75
OD_SPHERE: -1.75

## 2025-08-11 ASSESSMENT — VISUAL ACUITY
OD_BCVA: 20/30
OS_BCVA: 20/40

## 2025-08-11 ASSESSMENT — PACHYMETRY
OD_CT_CORRECTION: -1
OS_CT_UM: 542
OD_CT_UM: 555
OS_CT_CORRECTION: 0

## 2025-08-11 ASSESSMENT — SUPERFICIAL PUNCTATE KERATITIS (SPK)
OS_SPK: T
OD_SPK: T

## 2025-08-11 ASSESSMENT — TONOMETRY
OD_IOP_MMHG: 12
OS_IOP_MMHG: 13